# Patient Record
Sex: FEMALE | Race: WHITE | NOT HISPANIC OR LATINO | Employment: FULL TIME | URBAN - METROPOLITAN AREA
[De-identification: names, ages, dates, MRNs, and addresses within clinical notes are randomized per-mention and may not be internally consistent; named-entity substitution may affect disease eponyms.]

---

## 2020-10-26 ENCOUNTER — OFFICE VISIT (OUTPATIENT)
Dept: URGENT CARE | Facility: CLINIC | Age: 35
End: 2020-10-26
Payer: COMMERCIAL

## 2020-10-26 ENCOUNTER — HOSPITAL ENCOUNTER (OUTPATIENT)
Facility: MEDICAL CENTER | Age: 35
End: 2020-10-26
Attending: PHYSICIAN ASSISTANT
Payer: COMMERCIAL

## 2020-10-26 VITALS
HEART RATE: 88 BPM | TEMPERATURE: 97.5 F | RESPIRATION RATE: 16 BRPM | BODY MASS INDEX: 21.53 KG/M2 | HEIGHT: 66 IN | SYSTOLIC BLOOD PRESSURE: 128 MMHG | DIASTOLIC BLOOD PRESSURE: 80 MMHG | WEIGHT: 134 LBS | OXYGEN SATURATION: 99 %

## 2020-10-26 DIAGNOSIS — R43.0 LOSS OF PERCEPTION FOR SMELL: ICD-10-CM

## 2020-10-26 DIAGNOSIS — R09.81 SINUS CONGESTION: ICD-10-CM

## 2020-10-26 LAB — COVID ORDER STATUS COVID19: NORMAL

## 2020-10-26 PROCEDURE — U0003 INFECTIOUS AGENT DETECTION BY NUCLEIC ACID (DNA OR RNA); SEVERE ACUTE RESPIRATORY SYNDROME CORONAVIRUS 2 (SARS-COV-2) (CORONAVIRUS DISEASE [COVID-19]), AMPLIFIED PROBE TECHNIQUE, MAKING USE OF HIGH THROUGHPUT TECHNOLOGIES AS DESCRIBED BY CMS-2020-01-R: HCPCS

## 2020-10-26 PROCEDURE — 99203 OFFICE O/P NEW LOW 30 MIN: CPT | Performed by: PHYSICIAN ASSISTANT

## 2020-10-26 ASSESSMENT — ENCOUNTER SYMPTOMS
CHILLS: 0
SORE THROAT: 0
WHEEZING: 0
VOMITING: 0
FEVER: 0
NAUSEA: 0
DIARRHEA: 0
SHORTNESS OF BREATH: 0
SPUTUM PRODUCTION: 0
ABDOMINAL PAIN: 0
COUGH: 0

## 2020-10-26 NOTE — PROGRESS NOTES
"  Subjective:     Elidia Millard  is a 35 y.o. female who presents for Congestion (x4days, loss of smell)      Other  This is a new problem. The current episode started in the past 7 days. Pertinent negatives include no abdominal pain, chills, congestion, coughing, fever, nausea, rash, sore throat or vomiting.     Patient comes clinic complaining of loss of smell and some mild sinus congestion times last 3 or 4 days.  She denies fevers chills or cough.  She denies sore throat or ear pain.  She denies headache or diarrhea.  She denies nausea vomiting abdominal pain or rash.  She states she is been working from home has been very careful about exposure to individuals who may have Covid but was around a friend who is \"sniffly\" 1 week prior to onset of symptoms.  She denies history of asthma bronchitis or pneumonia.  She denies treatments tried thus far.  She states she has had absolutely no ability to smell yesterday and today.    Review of Systems   Constitutional: Negative for chills and fever.   HENT: Negative for congestion, ear pain and sore throat.         Loss of smell   Respiratory: Negative for cough, sputum production, shortness of breath and wheezing.    Gastrointestinal: Negative for abdominal pain, diarrhea, nausea and vomiting.   Skin: Negative for rash.       Medications:    • CitraNatal Assure Misc  • Norethindrone Acet-Ethinyl Est Tabs    Allergies: Pcn [penicillins]    Problem List: Elidia Millard does not have a problem list on file.    Surgical History:  No past surgical history on file.    Past Social Hx: Elidia Millard  reports that she has never smoked. She has never used smokeless tobacco. She reports that she does not drink alcohol or use drugs.     Past Family Hx:  Elidia Millard family history includes Cancer in her maternal grandmother and paternal grandmother.     Problem list, medications, and allergies reviewed by myself today in Epic.     Objective:   /80 (BP Location: " "Left arm, Patient Position: Sitting, BP Cuff Size: Adult)   Pulse 88   Temp 36.4 °C (97.5 °F) (Temporal)   Resp 16   Ht 1.676 m (5' 6\")   Wt 60.8 kg (134 lb)   SpO2 99%   BMI 21.63 kg/m²     Physical Exam  Vitals signs and nursing note reviewed.   Constitutional:       General: She is not in acute distress.     Appearance: She is well-developed. She is not diaphoretic.   HENT:      Head: Normocephalic and atraumatic.      Right Ear: Tympanic membrane, ear canal and external ear normal.      Left Ear: Tympanic membrane, ear canal and external ear normal.      Nose: Nose normal.      Comments: Unable to smell alcohol swab     Mouth/Throat:      Pharynx: Uvula midline. Posterior oropharyngeal erythema ( mild PND) present. No oropharyngeal exudate.      Tonsils: No tonsillar abscesses.   Eyes:      General: Lids are normal. No scleral icterus.        Right eye: No discharge.         Left eye: No discharge.      Conjunctiva/sclera: Conjunctivae normal.   Neck:      Musculoskeletal: Neck supple.   Pulmonary:      Effort: Pulmonary effort is normal. No respiratory distress.      Breath sounds: No decreased breath sounds, wheezing, rhonchi or rales.   Musculoskeletal: Normal range of motion.   Lymphadenopathy:      Cervical: No cervical adenopathy.   Skin:     General: Skin is warm and dry.      Coloration: Skin is not pale.      Findings: No erythema.   Neurological:      Mental Status: She is alert and oriented to person, place, and time. She is not disoriented.   Psychiatric:         Speech: Speech normal.         Behavior: Behavior normal.     COVID pending    Assessment/Plan:   Assessment      1. Loss of perception for smell  - COVID/SARS COV-2 PCR; Future    2. Sinus congestion  - COVID/SARS COV-2 PCR; Future  Supportive care is reviewed with patient/caregiver - recommend to push PO fluids and electrolytes, over-the-counter symptom support medications reviewed, ER precautions with worsened symptoms, quarantine " recommendations reviewed, sent with letter, [place] for results of testing  Return to clinic with lack of resolution or progression of symptoms.  ER precautions with any worsening symptoms are reviewed with patient/caregiver and they do express understanding      I have worn an N95 mask, gloves and eye protection for the entire encounter with this patient.     Differential diagnosis, natural history, supportive care, and indications for immediate follow-up discussed.

## 2020-10-26 NOTE — LETTER
October 26, 2020       Patient: Elidia Millard   YOB: 1985   Date of Visit: 10/26/2020         To Whom It May Concern:  A concern for COVID-19 has been identified and testing is in progress.?   ?  We are asking you to excuse absences while following self-isolation protocol per Center for Disease Control (CDC) guidelines. 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   ?  If the results of testing are negative, and once there has been no fever (temperature >100.4 F) for at least 72 hours without treatment, and no vomiting or diarrhea for at least 48 hours, then return to work is approved.   ?  If the results of testing are positive then your employee/student will be contacted by the [facility] or [facility] department for further instructions on duration of self-isolation and return to work protocol. In general, this will also follow the CDC guidelines with a minimum of 10 days from the onset of symptoms and without fever, vomiting, or diarrhea as above.?   ?  In general, repeat testing is not necessary and not offered through our [facility] care.?   ?  This is the only note that will be provided from [facility] for this visit. Your employee/student will require an appointment with a primary care provider if FMLA or disability forms are required.   ?      Sincerely,          Mikel Arredondo P.A.-C.  Electronically Signed

## 2020-10-27 ENCOUNTER — TELEPHONE (OUTPATIENT)
Dept: URGENT CARE | Facility: CLINIC | Age: 35
End: 2020-10-27

## 2020-10-27 LAB
SARS-COV-2 RNA RESP QL NAA+PROBE: DETECTED
SPECIMEN SOURCE: ABNORMAL

## 2020-10-27 NOTE — TELEPHONE ENCOUNTER
The lab called and notified us that the covid results came back positive.    Please advise.   Ludmila

## 2022-07-05 ENCOUNTER — ANESTHESIA EVENT (OUTPATIENT)
Dept: OBGYN | Facility: MEDICAL CENTER | Age: 37
End: 2022-07-05
Payer: COMMERCIAL

## 2022-07-05 ENCOUNTER — ANESTHESIA (OUTPATIENT)
Dept: OBGYN | Facility: MEDICAL CENTER | Age: 37
End: 2022-07-05
Payer: COMMERCIAL

## 2022-07-05 ENCOUNTER — HOSPITAL ENCOUNTER (INPATIENT)
Facility: MEDICAL CENTER | Age: 37
LOS: 4 days | End: 2022-07-09
Attending: OBSTETRICS & GYNECOLOGY | Admitting: OBSTETRICS & GYNECOLOGY
Payer: COMMERCIAL

## 2022-07-05 DIAGNOSIS — O92.79 POOR LATCH ON, POSTPARTUM: Primary | ICD-10-CM

## 2022-07-05 DIAGNOSIS — G89.18 PAIN FOLLOWING SURGERY OR PROCEDURE: ICD-10-CM

## 2022-07-05 LAB
ABO + RH BLD: NORMAL
ABO GROUP BLD: ABNORMAL
ANISOCYTOSIS BLD QL SMEAR: ABNORMAL
BARCODED ABORH UBTYP: 9500
BARCODED ABORH UBTYP: 9500
BARCODED PRD CODE UBPRD: ABNORMAL
BARCODED PRD CODE UBPRD: ABNORMAL
BARCODED UNIT NUM UBUNT: ABNORMAL
BARCODED UNIT NUM UBUNT: ABNORMAL
BASOPHILS # BLD AUTO: 0.9 % (ref 0–1.8)
BASOPHILS # BLD: 0.1 K/UL (ref 0–0.12)
BLD GP AB INVEST PLASRBC-IMP: ABNORMAL
BLD GP AB SCN SERPL QL: ABNORMAL
COMPONENT R 8504R: ABNORMAL
COMPONENT R 8504R: ABNORMAL
EOSINOPHIL # BLD AUTO: 0.18 K/UL (ref 0–0.51)
EOSINOPHIL NFR BLD: 1.7 % (ref 0–6.9)
ERYTHROCYTE [DISTWIDTH] IN BLOOD BY AUTOMATED COUNT: 50.9 FL (ref 35.9–50)
HCT VFR BLD AUTO: 29.1 % (ref 37–47)
HGB BLD-MCNC: 8.5 G/DL (ref 12–16)
LYMPHOCYTES # BLD AUTO: 2.02 K/UL (ref 1–4.8)
LYMPHOCYTES NFR BLD: 19.1 % (ref 22–41)
MANUAL DIFF BLD: NORMAL
MCH RBC QN AUTO: 21.1 PG (ref 27–33)
MCHC RBC AUTO-ENTMCNC: 29.2 G/DL (ref 33.6–35)
MCV RBC AUTO: 72.2 FL (ref 81.4–97.8)
MICROCYTES BLD QL SMEAR: ABNORMAL
MONOCYTES # BLD AUTO: 0.65 K/UL (ref 0–0.85)
MONOCYTES NFR BLD AUTO: 6.1 % (ref 0–13.4)
MORPHOLOGY BLD-IMP: NORMAL
MYELOCYTES NFR BLD MANUAL: 0.9 %
NEUTROPHILS # BLD AUTO: 7.56 K/UL (ref 2–7.15)
NEUTROPHILS NFR BLD: 70.4 % (ref 44–72)
NEUTS BAND NFR BLD MANUAL: 0.9 % (ref 0–10)
NRBC # BLD AUTO: 0.04 K/UL
NRBC BLD-RTO: 0.4 /100 WBC
OVALOCYTES BLD QL SMEAR: NORMAL
PLATELET # BLD AUTO: 280 K/UL (ref 164–446)
PLATELET BLD QL SMEAR: NORMAL
PMV BLD AUTO: 10.9 FL (ref 9–12.9)
POIKILOCYTOSIS BLD QL SMEAR: NORMAL
POLYCHROMASIA BLD QL SMEAR: NORMAL
PRODUCT TYPE UPROD: ABNORMAL
PRODUCT TYPE UPROD: ABNORMAL
RBC # BLD AUTO: 4.03 M/UL (ref 4.2–5.4)
RBC BLD AUTO: PRESENT
RH BLD: ABNORMAL
UNIT STATUS USTAT: ABNORMAL
UNIT STATUS USTAT: ABNORMAL
WBC # BLD AUTO: 10.6 K/UL (ref 4.8–10.8)
XXX BLOOD GROUP AB TITR SERPL AHG: 2 {TITER}

## 2022-07-05 PROCEDURE — 700102 HCHG RX REV CODE 250 W/ 637 OVERRIDE(OP)

## 2022-07-05 PROCEDURE — 59514 CESAREAN DELIVERY ONLY: CPT | Mod: 80 | Performed by: OBSTETRICS & GYNECOLOGY

## 2022-07-05 PROCEDURE — 770002 HCHG ROOM/CARE - OB PRIVATE (112)

## 2022-07-05 PROCEDURE — 160009 HCHG ANES TIME/MIN: Performed by: OBSTETRICS & GYNECOLOGY

## 2022-07-05 PROCEDURE — 700101 HCHG RX REV CODE 250: Performed by: ANESTHESIOLOGY

## 2022-07-05 PROCEDURE — 36415 COLL VENOUS BLD VENIPUNCTURE: CPT

## 2022-07-05 PROCEDURE — 88307 TISSUE EXAM BY PATHOLOGIST: CPT | Mod: 59

## 2022-07-05 PROCEDURE — 700111 HCHG RX REV CODE 636 W/ 250 OVERRIDE (IP): Performed by: ANESTHESIOLOGY

## 2022-07-05 PROCEDURE — A9270 NON-COVERED ITEM OR SERVICE: HCPCS | Performed by: ANESTHESIOLOGY

## 2022-07-05 PROCEDURE — 700111 HCHG RX REV CODE 636 W/ 250 OVERRIDE (IP): Performed by: OBSTETRICS & GYNECOLOGY

## 2022-07-05 PROCEDURE — 86922 COMPATIBILITY TEST ANTIGLOB: CPT | Mod: 91

## 2022-07-05 PROCEDURE — 160035 HCHG PACU - 1ST 60 MINS PHASE I: Performed by: OBSTETRICS & GYNECOLOGY

## 2022-07-05 PROCEDURE — 86900 BLOOD TYPING SEROLOGIC ABO: CPT

## 2022-07-05 PROCEDURE — 160041 HCHG SURGERY MINUTES - EA ADDL 1 MIN LEVEL 4: Performed by: OBSTETRICS & GYNECOLOGY

## 2022-07-05 PROCEDURE — 160029 HCHG SURGERY MINUTES - 1ST 30 MINS LEVEL 4: Performed by: OBSTETRICS & GYNECOLOGY

## 2022-07-05 PROCEDURE — 01961 ANES CESAREAN DELIVERY ONLY: CPT | Performed by: ANESTHESIOLOGY

## 2022-07-05 PROCEDURE — 58611 LIGATE OVIDUCT(S) ADD-ON: CPT | Mod: 80 | Performed by: OBSTETRICS & GYNECOLOGY

## 2022-07-05 PROCEDURE — C1755 CATHETER, INTRASPINAL: HCPCS | Performed by: OBSTETRICS & GYNECOLOGY

## 2022-07-05 PROCEDURE — 700105 HCHG RX REV CODE 258: Performed by: ANESTHESIOLOGY

## 2022-07-05 PROCEDURE — 160002 HCHG RECOVERY MINUTES (STAT): Performed by: OBSTETRICS & GYNECOLOGY

## 2022-07-05 PROCEDURE — 88302 TISSUE EXAM BY PATHOLOGIST: CPT | Mod: 59

## 2022-07-05 PROCEDURE — 160048 HCHG OR STATISTICAL LEVEL 1-5: Performed by: OBSTETRICS & GYNECOLOGY

## 2022-07-05 PROCEDURE — 86870 RBC ANTIBODY IDENTIFICATION: CPT

## 2022-07-05 PROCEDURE — 86901 BLOOD TYPING SEROLOGIC RH(D): CPT

## 2022-07-05 PROCEDURE — 0UB70ZZ EXCISION OF BILATERAL FALLOPIAN TUBES, OPEN APPROACH: ICD-10-PCS | Performed by: OBSTETRICS & GYNECOLOGY

## 2022-07-05 PROCEDURE — 700105 HCHG RX REV CODE 258: Performed by: OBSTETRICS & GYNECOLOGY

## 2022-07-05 PROCEDURE — 99283 EMERGENCY DEPT VISIT LOW MDM: CPT

## 2022-07-05 PROCEDURE — A9270 NON-COVERED ITEM OR SERVICE: HCPCS

## 2022-07-05 PROCEDURE — 86850 RBC ANTIBODY SCREEN: CPT

## 2022-07-05 PROCEDURE — 302790 HCHG STAT ANTEPARTUM CARE, DAILY

## 2022-07-05 PROCEDURE — 86886 COOMBS TEST INDIRECT TITER: CPT

## 2022-07-05 PROCEDURE — 85007 BL SMEAR W/DIFF WBC COUNT: CPT

## 2022-07-05 PROCEDURE — 700102 HCHG RX REV CODE 250 W/ 637 OVERRIDE(OP): Performed by: ANESTHESIOLOGY

## 2022-07-05 PROCEDURE — 85025 COMPLETE CBC W/AUTO DIFF WBC: CPT

## 2022-07-05 RX ORDER — CARBOPROST TROMETHAMINE 250 UG/ML
250 INJECTION, SOLUTION INTRAMUSCULAR
Status: CANCELLED | OUTPATIENT
Start: 2022-07-05

## 2022-07-05 RX ORDER — SODIUM CHLORIDE, SODIUM LACTATE, POTASSIUM CHLORIDE, CALCIUM CHLORIDE 600; 310; 30; 20 MG/100ML; MG/100ML; MG/100ML; MG/100ML
INJECTION, SOLUTION INTRAVENOUS ONCE
Status: CANCELLED | OUTPATIENT
Start: 2022-07-05 | End: 2022-07-05

## 2022-07-05 RX ORDER — DEXAMETHASONE SODIUM PHOSPHATE 4 MG/ML
INJECTION, SOLUTION INTRA-ARTICULAR; INTRALESIONAL; INTRAMUSCULAR; INTRAVENOUS; SOFT TISSUE PRN
Status: DISCONTINUED | OUTPATIENT
Start: 2022-07-05 | End: 2022-07-05 | Stop reason: SURG

## 2022-07-05 RX ORDER — HALOPERIDOL 5 MG/ML
1 INJECTION INTRAMUSCULAR
Status: CANCELLED | OUTPATIENT
Start: 2022-07-05

## 2022-07-05 RX ORDER — OXYCODONE HCL 5 MG/5 ML
5 SOLUTION, ORAL ORAL
Status: CANCELLED | OUTPATIENT
Start: 2022-07-05

## 2022-07-05 RX ORDER — METOCLOPRAMIDE HYDROCHLORIDE 5 MG/ML
10 INJECTION INTRAMUSCULAR; INTRAVENOUS ONCE
Status: COMPLETED | OUTPATIENT
Start: 2022-07-05 | End: 2022-07-05

## 2022-07-05 RX ORDER — ONDANSETRON 2 MG/ML
INJECTION INTRAMUSCULAR; INTRAVENOUS PRN
Status: DISCONTINUED | OUTPATIENT
Start: 2022-07-05 | End: 2022-07-05 | Stop reason: SURG

## 2022-07-05 RX ORDER — OXYTOCIN 10 [USP'U]/ML
10 INJECTION, SOLUTION INTRAMUSCULAR; INTRAVENOUS
Status: CANCELLED | OUTPATIENT
Start: 2022-07-05

## 2022-07-05 RX ORDER — DIPHENHYDRAMINE HYDROCHLORIDE 50 MG/ML
12.5 INJECTION INTRAMUSCULAR; INTRAVENOUS
Status: CANCELLED | OUTPATIENT
Start: 2022-07-05

## 2022-07-05 RX ORDER — CEFAZOLIN SODIUM 2 G/100ML
2 INJECTION, SOLUTION INTRAVENOUS ONCE
Status: COMPLETED | OUTPATIENT
Start: 2022-07-05 | End: 2022-07-05

## 2022-07-05 RX ORDER — HYDROMORPHONE HYDROCHLORIDE 1 MG/ML
0.4 INJECTION, SOLUTION INTRAMUSCULAR; INTRAVENOUS; SUBCUTANEOUS
Status: CANCELLED | OUTPATIENT
Start: 2022-07-05

## 2022-07-05 RX ORDER — METHYLERGONOVINE MALEATE 0.2 MG/ML
0.2 INJECTION INTRAVENOUS
Status: CANCELLED | OUTPATIENT
Start: 2022-07-05

## 2022-07-05 RX ORDER — CITRIC ACID/SODIUM CITRATE 334-500MG
30 SOLUTION, ORAL ORAL ONCE
Status: COMPLETED | OUTPATIENT
Start: 2022-07-05 | End: 2022-07-05

## 2022-07-05 RX ORDER — MISOPROSTOL 200 UG/1
800 TABLET ORAL
Status: CANCELLED | OUTPATIENT
Start: 2022-07-05

## 2022-07-05 RX ORDER — SODIUM CHLORIDE, SODIUM LACTATE, POTASSIUM CHLORIDE, CALCIUM CHLORIDE 600; 310; 30; 20 MG/100ML; MG/100ML; MG/100ML; MG/100ML
INJECTION, SOLUTION INTRAVENOUS CONTINUOUS
Status: CANCELLED | OUTPATIENT
Start: 2022-07-05

## 2022-07-05 RX ORDER — LABETALOL HYDROCHLORIDE 5 MG/ML
5 INJECTION, SOLUTION INTRAVENOUS
Status: CANCELLED | OUTPATIENT
Start: 2022-07-05

## 2022-07-05 RX ORDER — KETOROLAC TROMETHAMINE 30 MG/ML
INJECTION, SOLUTION INTRAMUSCULAR; INTRAVENOUS PRN
Status: DISCONTINUED | OUTPATIENT
Start: 2022-07-05 | End: 2022-07-05 | Stop reason: SURG

## 2022-07-05 RX ORDER — TRANEXAMIC ACID 100 MG/ML
INJECTION, SOLUTION INTRAVENOUS PRN
Status: DISCONTINUED | OUTPATIENT
Start: 2022-07-05 | End: 2022-07-05 | Stop reason: SURG

## 2022-07-05 RX ORDER — SODIUM CHLORIDE, SODIUM GLUCONATE, SODIUM ACETATE, POTASSIUM CHLORIDE AND MAGNESIUM CHLORIDE 526; 502; 368; 37; 30 MG/100ML; MG/100ML; MG/100ML; MG/100ML; MG/100ML
1500 INJECTION, SOLUTION INTRAVENOUS ONCE
Status: COMPLETED | OUTPATIENT
Start: 2022-07-05 | End: 2022-07-05

## 2022-07-05 RX ORDER — ASPIRIN 81 MG/1
81 TABLET, CHEWABLE ORAL DAILY
Status: ON HOLD | COMMUNITY
End: 2022-07-09

## 2022-07-05 RX ORDER — BUPIVACAINE HYDROCHLORIDE 7.5 MG/ML
INJECTION, SOLUTION INTRASPINAL
Status: COMPLETED | OUTPATIENT
Start: 2022-07-05 | End: 2022-07-05

## 2022-07-05 RX ORDER — CEFAZOLIN SODIUM 1 G/3ML
INJECTION, POWDER, FOR SOLUTION INTRAMUSCULAR; INTRAVENOUS PRN
Status: DISCONTINUED | OUTPATIENT
Start: 2022-07-05 | End: 2022-07-05 | Stop reason: SURG

## 2022-07-05 RX ORDER — HYDROMORPHONE HYDROCHLORIDE 1 MG/ML
0.1 INJECTION, SOLUTION INTRAMUSCULAR; INTRAVENOUS; SUBCUTANEOUS
Status: CANCELLED | OUTPATIENT
Start: 2022-07-05

## 2022-07-05 RX ORDER — SODIUM CHLORIDE, SODIUM LACTATE, POTASSIUM CHLORIDE, CALCIUM CHLORIDE 600; 310; 30; 20 MG/100ML; MG/100ML; MG/100ML; MG/100ML
INJECTION, SOLUTION INTRAVENOUS CONTINUOUS
Status: DISCONTINUED | OUTPATIENT
Start: 2022-07-05 | End: 2022-07-09 | Stop reason: HOSPADM

## 2022-07-05 RX ORDER — MORPHINE SULFATE 0.5 MG/ML
INJECTION, SOLUTION EPIDURAL; INTRATHECAL; INTRAVENOUS
Status: COMPLETED | OUTPATIENT
Start: 2022-07-05 | End: 2022-07-05

## 2022-07-05 RX ORDER — OXYTOCIN 10 [USP'U]/ML
INJECTION, SOLUTION INTRAMUSCULAR; INTRAVENOUS PRN
Status: DISCONTINUED | OUTPATIENT
Start: 2022-07-05 | End: 2022-07-05 | Stop reason: SURG

## 2022-07-05 RX ORDER — MIDAZOLAM HYDROCHLORIDE 1 MG/ML
1 INJECTION INTRAMUSCULAR; INTRAVENOUS
Status: CANCELLED | OUTPATIENT
Start: 2022-07-05

## 2022-07-05 RX ORDER — METOPROLOL TARTRATE 1 MG/ML
1 INJECTION, SOLUTION INTRAVENOUS
Status: CANCELLED | OUTPATIENT
Start: 2022-07-05

## 2022-07-05 RX ORDER — ONDANSETRON 2 MG/ML
4 INJECTION INTRAMUSCULAR; INTRAVENOUS
Status: CANCELLED | OUTPATIENT
Start: 2022-07-05

## 2022-07-05 RX ORDER — AZITHROMYCIN 500 MG/5ML
500 INJECTION, POWDER, LYOPHILIZED, FOR SOLUTION INTRAVENOUS ONCE
Status: DISCONTINUED | OUTPATIENT
Start: 2022-07-05 | End: 2022-07-05

## 2022-07-05 RX ORDER — MISOPROSTOL 200 UG/1
TABLET ORAL
Status: COMPLETED
Start: 2022-07-05 | End: 2022-07-05

## 2022-07-05 RX ORDER — OXYCODONE HCL 5 MG/5 ML
10 SOLUTION, ORAL ORAL
Status: CANCELLED | OUTPATIENT
Start: 2022-07-05

## 2022-07-05 RX ORDER — HYDROMORPHONE HYDROCHLORIDE 1 MG/ML
0.2 INJECTION, SOLUTION INTRAMUSCULAR; INTRAVENOUS; SUBCUTANEOUS
Status: CANCELLED | OUTPATIENT
Start: 2022-07-05

## 2022-07-05 RX ORDER — IPRATROPIUM BROMIDE AND ALBUTEROL SULFATE 2.5; .5 MG/3ML; MG/3ML
3 SOLUTION RESPIRATORY (INHALATION)
Status: CANCELLED | OUTPATIENT
Start: 2022-07-05

## 2022-07-05 RX ORDER — HYDRALAZINE HYDROCHLORIDE 20 MG/ML
5 INJECTION INTRAMUSCULAR; INTRAVENOUS
Status: CANCELLED | OUTPATIENT
Start: 2022-07-05

## 2022-07-05 RX ORDER — SODIUM CHLORIDE, SODIUM GLUCONATE, SODIUM ACETATE, POTASSIUM CHLORIDE AND MAGNESIUM CHLORIDE 526; 502; 368; 37; 30 MG/100ML; MG/100ML; MG/100ML; MG/100ML; MG/100ML
INJECTION, SOLUTION INTRAVENOUS
Status: DISCONTINUED | OUTPATIENT
Start: 2022-07-05 | End: 2022-07-05 | Stop reason: SURG

## 2022-07-05 RX ORDER — MEPERIDINE HYDROCHLORIDE 25 MG/ML
12.5 INJECTION INTRAMUSCULAR; INTRAVENOUS; SUBCUTANEOUS
Status: CANCELLED | OUTPATIENT
Start: 2022-07-05

## 2022-07-05 RX ADMIN — MISOPROSTOL 200 MCG: 200 TABLET ORAL at 23:30

## 2022-07-05 RX ADMIN — SODIUM CHLORIDE, SODIUM GLUCONATE, SODIUM ACETATE, POTASSIUM CHLORIDE AND MAGNESIUM CHLORIDE 1500 ML: 526; 502; 368; 37; 30 INJECTION, SOLUTION INTRAVENOUS at 21:22

## 2022-07-05 RX ADMIN — DEXAMETHASONE SODIUM PHOSPHATE 8 MG: 4 INJECTION, SOLUTION INTRA-ARTICULAR; INTRALESIONAL; INTRAMUSCULAR; INTRAVENOUS; SOFT TISSUE at 22:26

## 2022-07-05 RX ADMIN — ONDANSETRON 4 MG: 2 INJECTION INTRAMUSCULAR; INTRAVENOUS at 23:12

## 2022-07-05 RX ADMIN — SODIUM CHLORIDE, POTASSIUM CHLORIDE, SODIUM LACTATE AND CALCIUM CHLORIDE: 600; 310; 30; 20 INJECTION, SOLUTION INTRAVENOUS at 21:00

## 2022-07-05 RX ADMIN — CEFAZOLIN SODIUM 2 G: 2 INJECTION, SOLUTION INTRAVENOUS at 21:30

## 2022-07-05 RX ADMIN — FAMOTIDINE 20 MG: 10 INJECTION, SOLUTION INTRAVENOUS at 22:00

## 2022-07-05 RX ADMIN — SODIUM CHLORIDE, SODIUM GLUCONATE, SODIUM ACETATE, POTASSIUM CHLORIDE AND MAGNESIUM CHLORIDE: 526; 502; 368; 37; 30 INJECTION, SOLUTION INTRAVENOUS at 22:20

## 2022-07-05 RX ADMIN — OXYTOCIN 20 UNITS: 10 INJECTION, SOLUTION INTRAMUSCULAR; INTRAVENOUS at 23:04

## 2022-07-05 RX ADMIN — BUPIVACAINE HYDROCHLORIDE IN DEXTROSE 2 ML: 7.5 INJECTION, SOLUTION SUBARACHNOID at 22:40

## 2022-07-05 RX ADMIN — SODIUM CHLORIDE, SODIUM GLUCONATE, SODIUM ACETATE, POTASSIUM CHLORIDE AND MAGNESIUM CHLORIDE: 526; 502; 368; 37; 30 INJECTION, SOLUTION INTRAVENOUS at 22:56

## 2022-07-05 RX ADMIN — METOCLOPRAMIDE 10 MG: 5 INJECTION, SOLUTION INTRAMUSCULAR; INTRAVENOUS at 22:00

## 2022-07-05 RX ADMIN — KETOROLAC TROMETHAMINE 30 MG: 30 INJECTION, SOLUTION INTRAMUSCULAR at 23:19

## 2022-07-05 RX ADMIN — TRANEXAMIC ACID 1000 MG: 100 INJECTION, SOLUTION INTRAVENOUS at 22:26

## 2022-07-05 RX ADMIN — MORPHINE SULFATE 150 MCG: 0.5 INJECTION, SOLUTION EPIDURAL; INTRATHECAL; INTRAVENOUS at 22:40

## 2022-07-05 RX ADMIN — PHENYLEPHRINE HYDROCHLORIDE 20 MCG/MIN: 10 INJECTION INTRAVENOUS at 22:25

## 2022-07-05 RX ADMIN — CEFAZOLIN 2 G: 330 INJECTION, POWDER, FOR SOLUTION INTRAMUSCULAR; INTRAVENOUS at 22:26

## 2022-07-05 RX ADMIN — SODIUM CITRATE AND CITRIC ACID MONOHYDRATE 30 ML: 500; 334 SOLUTION ORAL at 22:01

## 2022-07-05 ASSESSMENT — LIFESTYLE VARIABLES
HAVE PEOPLE ANNOYED YOU BY CRITICIZING YOUR DRINKING: NO
HAVE YOU EVER FELT YOU SHOULD CUT DOWN ON YOUR DRINKING: NO
EVER HAD A DRINK FIRST THING IN THE MORNING TO STEADY YOUR NERVES TO GET RID OF A HANGOVER: NO
EVER FELT BAD OR GUILTY ABOUT YOUR DRINKING: NO
AVERAGE NUMBER OF DAYS PER WEEK YOU HAVE A DRINK CONTAINING ALCOHOL: 0
TOTAL SCORE: 0
EVER_SMOKED: NEVER
CONSUMPTION TOTAL: NEGATIVE
TOTAL SCORE: 0
ON A TYPICAL DAY WHEN YOU DRINK ALCOHOL HOW MANY DRINKS DO YOU HAVE: 0
HOW MANY TIMES IN THE PAST YEAR HAVE YOU HAD 5 OR MORE DRINKS IN A DAY: 0
TOTAL SCORE: 0
ALCOHOL_USE: NO

## 2022-07-05 ASSESSMENT — PATIENT HEALTH QUESTIONNAIRE - PHQ9
SUM OF ALL RESPONSES TO PHQ9 QUESTIONS 1 AND 2: 0
1. LITTLE INTEREST OR PLEASURE IN DOING THINGS: NOT AT ALL
2. FEELING DOWN, DEPRESSED, IRRITABLE, OR HOPELESS: NOT AT ALL
2. FEELING DOWN, DEPRESSED, IRRITABLE, OR HOPELESS: NOT AT ALL
SUM OF ALL RESPONSES TO PHQ9 QUESTIONS 1 AND 2: 0
1. LITTLE INTEREST OR PLEASURE IN DOING THINGS: NOT AT ALL

## 2022-07-05 ASSESSMENT — COPD QUESTIONNAIRES
IN THE PAST 12 MONTHS DO YOU DO LESS THAN YOU USED TO BECAUSE OF YOUR BREATHING PROBLEMS: DISAGREE/UNSURE
COPD SCREENING SCORE: 0
DURING THE PAST 4 WEEKS HOW MUCH DID YOU FEEL SHORT OF BREATH: NONE/LITTLE OF THE TIME
DO YOU EVER COUGH UP ANY MUCUS OR PHLEGM?: NO/ONLY WITH OCCASIONAL COLDS OR INFECTIONS
HAVE YOU SMOKED AT LEAST 100 CIGARETTES IN YOUR ENTIRE LIFE: NO/DON'T KNOW

## 2022-07-05 NOTE — H&P
Department of Obstetrics and Gynecology  Labor and Delivery History and Physical    Date of Admission: 2022     ID: 37 y.o.  @ 35w4d with di/di TIUP    Primary OB: Araceli Moeller M.D.     Attending OB: Daisy Ramires M.D.     CC: Referred from [facility] for fetal deceleration on twin B     HPI: Elidia Millard is a 37 y.o.  @ 35w4d here for prolonged monitoring for fetal deceleration on twin B while undergoing fetal surveillance at [facility]. She is feeling good FM. Denies CTX. She would prefer to have an  if possible, but baby B has been breech for awhile. If she has a csxn, she would like sterilization.     Pregnancy notable for di/di TIUP with IUGR and VSD on twin A (vertex) and normal growth on twin B. Pregnancy also complicated by GDMA1 and AMA      ROS: 10 systems reviewed and negative except as noted above.    Obstetric History    - 2008, female, , 7lb3oz, Epidural, [facility]  G2 - , female, , [id number], no pain medis  G3 - spontaneous di/di twins. Genders a surprise!       Past Medical History  Surgical History   Asthma  H/o Covid 10/2020  S/p J and J vaccine. No booster none      Gynecologic History  Social History   regular menses prior to pregnancy  denies Hx of abnormal pap smears.  denies Hx of STIs Tobacco: denies  EtOH: denies  Street Drugs: denies  .  is Michael.       Medications  Allergies   No current facility-administered medications on file prior to encounter.     Current Outpatient Medications on File Prior to Encounter   Medication Sig Dispense Refill   • Norethindrone Acet-Ethinyl Est (LOESTRIN 1.5, 21,) 1.5-30 MG-MCG Tab Take  by mouth.     • Prenat w/o A-FeCbGl-DSS-FA-DHA (CITRANATAL ASSURE) 35-1 & 300 MG MISC Take 1 Cap by mouth every day. (Patient not taking: Reported on 10/26/2020) 30 Each 12    Pcn [penicillins]-->rash        O: /83   Pulse (!) 109   Temp 36.8 °C (98.3 °F) (Temporal)   Resp 18     Gen: NAD, AAO  Abd: Gravid,  NTTP,Cephalic by Leopolds, No rebound or guarding  Ext: NTTP, no edema, 2+DPP  Pelvic: SVE deferred     FHT A: 120s with moderate long-term variability accelerations present no decelerations noted  FHT B: Baseline 150s with moderate long-term variability accelerations present occasional variable decelerations noted to 70 bpm  [place]: occasional    Labs:   Recent Labs     22  1754   WBC 10.6   RBC 4.03*   HEMOGLOBIN 8.5*   HEMATOCRIT 29.1*   MCV 72.2*   MCH 21.1*   RDW 50.9*   PLATELETCT 280   MPV 10.9   NEUTSPOLYS 70.40   LYMPHOCYTES 19.10*   MONOCYTES 6.10   EOSINOPHILS 1.70   BASOPHILS 0.90   RBCMORPHOLO Present     Prenatal labs:  Rh negative, ABS negative, RubImm, HBsAg, HIV NR, RPR NR, Simran immune .  1h glucose 190.  GBS unknown.          A/P: Elidia Wade Millard is a 37 y.o.  @ 35w4d with di/di TIUP, IUGR Fetus A, Fetal deceleration Fetus B, GDMA1, AMA    Discussion: Intermittent fetal decelerations have been noted on twin B which is breech.  Twin A is noted to be IUGR with a fetal VSD.  Given the current fetal surveillance and gestational age I would recommend proceeding with delivery.  She is amenable to a primary  section secondary to malpresentation.  She desires tubal sterilization.    The risks of a  have been reviewed with her in detail.  These include but are not limited to bleeding, pain, infection, damage to other organs nerves or blood vessels.  In the event of life-threatening blood loss hysterectomy or blood transfusion may be warranted.  She consents to this if clinically indicated.  She does not plan any future pregnancies and desires bilateral salpingectomy.  The risk of chronic pain or adhesions has been reviewed with her.  With regards to bilateral salpingectomy.  She understands that this is a permanent procedure.  She may have alterations in her menstrual course following this.  Including increased menorrhagia or dysmenorrhea.  All of her questions have been  answered and consent has been signed.  She also consents to blood transfusion if indicated    The risks of prematurity including the need for NICU admission.  Respiratory distress, glucose instability, temperature's instability, and increased jaundice have been reviewed with her.     Plan  -Admit to labor and delivery  -Type and screen  -Noted to be anemic. 2u PRBC on hold.  -NPO  -Plan LTCS/BS    Daisy Ramires M.D.,  7/5/2022, 4:17 PM

## 2022-07-05 NOTE — PROGRESS NOTES
1355: External monitors applied. Pt presents to L&D after being seen in the office today. Pt sent over for extended monitoring.     1730: Dr. Ramires notified of pt's FHT deceleration. Orders received.     1900: Report given to Amy ASH RN. POC discussed.

## 2022-07-06 LAB
ERYTHROCYTE [DISTWIDTH] IN BLOOD BY AUTOMATED COUNT: 51.8 FL (ref 35.9–50)
HCT VFR BLD AUTO: 22 % (ref 37–47)
HGB BLD-MCNC: 6.3 G/DL (ref 12–16)
MCH RBC QN AUTO: 20.9 PG (ref 27–33)
MCHC RBC AUTO-ENTMCNC: 28.6 G/DL (ref 33.6–35)
MCV RBC AUTO: 72.8 FL (ref 81.4–97.8)
NUMBER OF RH DOSES IND 8505RD: NORMAL
PATHOLOGY CONSULT NOTE: NORMAL
PLATELET # BLD AUTO: 232 K/UL (ref 164–446)
PMV BLD AUTO: 10.6 FL (ref 9–12.9)
RBC # BLD AUTO: 3.02 M/UL (ref 4.2–5.4)
WBC # BLD AUTO: 13.8 K/UL (ref 4.8–10.8)

## 2022-07-06 PROCEDURE — 700111 HCHG RX REV CODE 636 W/ 250 OVERRIDE (IP)

## 2022-07-06 PROCEDURE — 700111 HCHG RX REV CODE 636 W/ 250 OVERRIDE (IP): Performed by: OBSTETRICS & GYNECOLOGY

## 2022-07-06 PROCEDURE — 36415 COLL VENOUS BLD VENIPUNCTURE: CPT

## 2022-07-06 PROCEDURE — 770002 HCHG ROOM/CARE - OB PRIVATE (112)

## 2022-07-06 PROCEDURE — A9270 NON-COVERED ITEM OR SERVICE: HCPCS | Performed by: ANESTHESIOLOGY

## 2022-07-06 PROCEDURE — 700102 HCHG RX REV CODE 250 W/ 637 OVERRIDE(OP): Performed by: ANESTHESIOLOGY

## 2022-07-06 PROCEDURE — 85027 COMPLETE CBC AUTOMATED: CPT

## 2022-07-06 RX ORDER — DIPHENHYDRAMINE HCL 25 MG
25 TABLET ORAL EVERY 6 HOURS PRN
Status: DISCONTINUED | OUTPATIENT
Start: 2022-07-07 | End: 2022-07-09 | Stop reason: HOSPADM

## 2022-07-06 RX ORDER — OXYCODONE HYDROCHLORIDE 10 MG/1
10 TABLET ORAL EVERY 4 HOURS PRN
Status: ACTIVE | OUTPATIENT
Start: 2022-07-06 | End: 2022-07-07

## 2022-07-06 RX ORDER — DIPHENHYDRAMINE HYDROCHLORIDE 50 MG/ML
25 INJECTION INTRAMUSCULAR; INTRAVENOUS EVERY 6 HOURS PRN
Status: ACTIVE | OUTPATIENT
Start: 2022-07-06 | End: 2022-07-07

## 2022-07-06 RX ORDER — DIPHENHYDRAMINE HYDROCHLORIDE 50 MG/ML
12.5 INJECTION INTRAMUSCULAR; INTRAVENOUS EVERY 6 HOURS PRN
Status: ACTIVE | OUTPATIENT
Start: 2022-07-06 | End: 2022-07-07

## 2022-07-06 RX ORDER — ACETAMINOPHEN 500 MG
1000 TABLET ORAL EVERY 6 HOURS PRN
Status: DISCONTINUED | OUTPATIENT
Start: 2022-07-10 | End: 2022-07-09 | Stop reason: HOSPADM

## 2022-07-06 RX ORDER — ONDANSETRON 4 MG/1
4 TABLET, ORALLY DISINTEGRATING ORAL EVERY 6 HOURS PRN
Status: DISCONTINUED | OUTPATIENT
Start: 2022-07-07 | End: 2022-07-09 | Stop reason: HOSPADM

## 2022-07-06 RX ORDER — HYDROMORPHONE HYDROCHLORIDE 1 MG/ML
0.2 INJECTION, SOLUTION INTRAMUSCULAR; INTRAVENOUS; SUBCUTANEOUS
Status: ACTIVE | OUTPATIENT
Start: 2022-07-06 | End: 2022-07-07

## 2022-07-06 RX ORDER — KETOROLAC TROMETHAMINE 30 MG/ML
30 INJECTION, SOLUTION INTRAMUSCULAR; INTRAVENOUS EVERY 6 HOURS
Status: DISCONTINUED | OUTPATIENT
Start: 2022-07-06 | End: 2022-07-06

## 2022-07-06 RX ORDER — SODIUM CHLORIDE, SODIUM LACTATE, POTASSIUM CHLORIDE, CALCIUM CHLORIDE 600; 310; 30; 20 MG/100ML; MG/100ML; MG/100ML; MG/100ML
INJECTION, SOLUTION INTRAVENOUS PRN
Status: DISCONTINUED | OUTPATIENT
Start: 2022-07-06 | End: 2022-07-09 | Stop reason: HOSPADM

## 2022-07-06 RX ORDER — IBUPROFEN 800 MG/1
800 TABLET ORAL EVERY 8 HOURS
Status: DISCONTINUED | OUTPATIENT
Start: 2022-07-07 | End: 2022-07-09 | Stop reason: HOSPADM

## 2022-07-06 RX ORDER — ONDANSETRON 2 MG/ML
4 INJECTION INTRAMUSCULAR; INTRAVENOUS EVERY 6 HOURS PRN
Status: ACTIVE | OUTPATIENT
Start: 2022-07-06 | End: 2022-07-07

## 2022-07-06 RX ORDER — ACETAMINOPHEN 500 MG
1000 TABLET ORAL EVERY 6 HOURS
Status: COMPLETED | OUTPATIENT
Start: 2022-07-06 | End: 2022-07-06

## 2022-07-06 RX ORDER — DIPHENHYDRAMINE HYDROCHLORIDE 50 MG/ML
25 INJECTION INTRAMUSCULAR; INTRAVENOUS EVERY 6 HOURS PRN
Status: DISCONTINUED | OUTPATIENT
Start: 2022-07-07 | End: 2022-07-09 | Stop reason: HOSPADM

## 2022-07-06 RX ORDER — ACETAMINOPHEN 500 MG
1000 TABLET ORAL EVERY 6 HOURS
Status: DISCONTINUED | OUTPATIENT
Start: 2022-07-07 | End: 2022-07-09 | Stop reason: HOSPADM

## 2022-07-06 RX ORDER — ONDANSETRON 2 MG/ML
4 INJECTION INTRAMUSCULAR; INTRAVENOUS EVERY 6 HOURS PRN
Status: DISCONTINUED | OUTPATIENT
Start: 2022-07-07 | End: 2022-07-09 | Stop reason: HOSPADM

## 2022-07-06 RX ORDER — KETOROLAC TROMETHAMINE 30 MG/ML
30 INJECTION, SOLUTION INTRAMUSCULAR; INTRAVENOUS EVERY 6 HOURS
Status: DISPENSED | OUTPATIENT
Start: 2022-07-06 | End: 2022-07-07

## 2022-07-06 RX ORDER — OXYCODONE HYDROCHLORIDE 5 MG/1
5 TABLET ORAL EVERY 4 HOURS PRN
Status: DISCONTINUED | OUTPATIENT
Start: 2022-07-07 | End: 2022-07-09 | Stop reason: HOSPADM

## 2022-07-06 RX ORDER — OXYCODONE HYDROCHLORIDE 5 MG/1
5 TABLET ORAL EVERY 4 HOURS PRN
Status: ACTIVE | OUTPATIENT
Start: 2022-07-06 | End: 2022-07-07

## 2022-07-06 RX ORDER — DOCUSATE SODIUM 100 MG/1
100 CAPSULE, LIQUID FILLED ORAL 2 TIMES DAILY PRN
Status: DISCONTINUED | OUTPATIENT
Start: 2022-07-06 | End: 2022-07-09 | Stop reason: HOSPADM

## 2022-07-06 RX ORDER — KETOROLAC TROMETHAMINE 30 MG/ML
30 INJECTION, SOLUTION INTRAMUSCULAR; INTRAVENOUS EVERY 6 HOURS
Status: ACTIVE | OUTPATIENT
Start: 2022-07-06 | End: 2022-07-07

## 2022-07-06 RX ORDER — IBUPROFEN 800 MG/1
800 TABLET ORAL EVERY 8 HOURS PRN
Status: DISCONTINUED | OUTPATIENT
Start: 2022-07-10 | End: 2022-07-09 | Stop reason: HOSPADM

## 2022-07-06 RX ORDER — HYDROMORPHONE HYDROCHLORIDE 1 MG/ML
0.4 INJECTION, SOLUTION INTRAMUSCULAR; INTRAVENOUS; SUBCUTANEOUS
Status: ACTIVE | OUTPATIENT
Start: 2022-07-06 | End: 2022-07-07

## 2022-07-06 RX ORDER — VITAMIN A ACETATE, BETA CAROTENE, ASCORBIC ACID, CHOLECALCIFEROL, .ALPHA.-TOCOPHEROL ACETATE, DL-, THIAMINE MONONITRATE, RIBOFLAVIN, NIACINAMIDE, PYRIDOXINE HYDROCHLORIDE, FOLIC ACID, CYANOCOBALAMIN, CALCIUM CARBONATE, FERROUS FUMARATE, ZINC OXIDE, CUPRIC OXIDE 3080; 12; 120; 400; 1; 1.84; 3; 20; 22; 920; 25; 200; 27; 10; 2 [IU]/1; UG/1; MG/1; [IU]/1; MG/1; MG/1; MG/1; MG/1; MG/1; [IU]/1; MG/1; MG/1; MG/1; MG/1; MG/1
1 TABLET, FILM COATED ORAL
Status: DISCONTINUED | OUTPATIENT
Start: 2022-07-06 | End: 2022-07-09 | Stop reason: HOSPADM

## 2022-07-06 RX ORDER — OXYCODONE HYDROCHLORIDE 10 MG/1
10 TABLET ORAL EVERY 4 HOURS PRN
Status: DISCONTINUED | OUTPATIENT
Start: 2022-07-07 | End: 2022-07-09 | Stop reason: HOSPADM

## 2022-07-06 RX ADMIN — ACETAMINOPHEN 1000 MG: 500 TABLET ORAL at 03:14

## 2022-07-06 RX ADMIN — ACETAMINOPHEN 1000 MG: 500 TABLET ORAL at 09:03

## 2022-07-06 RX ADMIN — KETOROLAC TROMETHAMINE 30 MG: 30 INJECTION, SOLUTION INTRAMUSCULAR; INTRAVENOUS at 18:20

## 2022-07-06 RX ADMIN — OXYTOCIN 20 UNITS: 10 INJECTION, SOLUTION INTRAMUSCULAR; INTRAVENOUS at 00:25

## 2022-07-06 RX ADMIN — ACETAMINOPHEN 1000 MG: 500 TABLET ORAL at 16:53

## 2022-07-06 RX ADMIN — ACETAMINOPHEN 1000 MG: 500 TABLET ORAL at 21:26

## 2022-07-06 RX ADMIN — KETOROLAC TROMETHAMINE 30 MG: 30 INJECTION, SOLUTION INTRAMUSCULAR; INTRAVENOUS at 06:25

## 2022-07-06 ASSESSMENT — EDINBURGH POSTNATAL DEPRESSION SCALE (EPDS)
I HAVE BLAMED MYSELF UNNECESSARILY WHEN THINGS WENT WRONG: NOT VERY OFTEN
I HAVE BEEN SO UNHAPPY THAT I HAVE BEEN CRYING: NO, NEVER
I HAVE BEEN ANXIOUS OR WORRIED FOR NO GOOD REASON: YES, VERY OFTEN
I HAVE FELT SCARED OR PANICKY FOR NO GOOD REASON: NO, NOT MUCH
THE THOUGHT OF HARMING MYSELF HAS OCCURRED TO ME: NEVER
I HAVE BEEN ABLE TO LAUGH AND SEE THE FUNNY SIDE OF THINGS: AS MUCH AS I ALWAYS COULD
THINGS HAVE BEEN GETTING ON TOP OF ME: YES, SOMETIMES I HAVEN'T BEEN COPING AS WELL AS USUAL
I HAVE FELT SAD OR MISERABLE: NO, NOT AT ALL
I HAVE LOOKED FORWARD WITH ENJOYMENT TO THINGS: AS MUCH AS I EVER DID
I HAVE BEEN SO UNHAPPY THAT I HAVE HAD DIFFICULTY SLEEPING: NOT AT ALL

## 2022-07-06 NOTE — PROGRESS NOTES
1900- Report received from CARMEL Arellano. Care of patient assumed at this time.     1936-  Working at bedside to discuss POC.     2223- audible heart tones doppled in the OR    2301- delivery of viable baby girl    2303- delivery of viable baby boy    0115- Report given to CARMEL Soto. Care relinquished.

## 2022-07-06 NOTE — LACTATION NOTE
"This note was copied from a baby's chart.  Twins 35.5 weeks, , babies 10 hours old, baby A girl= 4# 7.6 oz, baby B boy= 4# 8 oz. CHERY Hx GDM- diet controlled. MOB reports she did breastfeed previous babies x 14-18 months. MOB has baby B STS allowing baby to suckle & lick nipple, no latch. Mother slow paced bottle feed 10 ml's of DBM. Patient's nurse slow pace bottle feeding baby A girl.     3 step plan:  1. Breastfeed  2. Supplement according to Guidelines 10-20-30  3. Pump & hand express   Every 3 hours or sooner if baby cues, feed a minimum 8 or more times in 24 hours    Pump settings speed 80/60, suction 28% x 15 minutes then hand express x 2-3 minutes, flange 25 mm. Pumped 14 ml of colostrum with 1st pump session, feed back at next feed. Teaching on cleaning pump parts after each pump session. Encouraged mother to keep babies STS while awake, will help promote breastfeeding. CHERY watched [employer] \"hand expression\" video,  provided demo on hand expression.      CHERY has [employer] insurance, OP lactation order placed in Epic.     Information sheets provided with review:  1. Storage Guidelines  2. Supplemental Guidelines 10-20-30  3. HG pump rental  4. NNB Resource  5. Your LPI    "

## 2022-07-06 NOTE — OR SURGEON
OP Note    PreOp Diagnosis:   1. 36yo  @ 35w5d  2. Di/di TIUP  3. IUGR fetus A  4. NRFS fetus B  5. AMA  6. Anemia  7. Desires sterilization  8. Vertex/breech presentation      PostOp Diagnosis:   1. S/p primary LTCS and bilateral salpingectomy  2. Di/Di TIUP  3. Vertex/Breech presentation  4. AMA  6. Anemia  7. Desires sterilization      Procedure(s):   SECTION, PRIMARY TWINS  BILATERAL SALPINGECTOMY    Surgeon(s):  JANI Melara M.D.     Anesthesiologist/Type of Anesthesia:  Anesthesiologist: Kenan Zuniga M.D./Spinal    Indication: Elidia is a 37-year-old  3 para 2-0-0-2 at 35 weeks and 5 days who has been undergoing fetal surveillance for a twin intrauterine pregnancy advanced maternal age and IUGR of twin A.  During her fetal surveillance today at the [facility] and here at the hospital fetus B has been having spontaneous decelerations to 70 bpm based on this finding as well as gestational age decision was made to proceed with delivery.  She has had a vertex breech presentation and desires primary  section with bilateral salpingectomy    Procedure: The patient was taken back to the operating room where she underwent placement of spinal anesthesia.  The initial spinal was not functioning adequately as a result she sat up again and a second spinal anesthesia was placed.  She was as positioned in dorsolithotomy position Barnhart catheter was placed under sterile conditions and she was sterilely prepped and draped in the usual fashion.  After assuring adequate anesthesia Pfannenstiel skin incision was made sharply and carried down to level fascia fascial incision was extended bilaterally with Garcia scissors Jil's were placed on the inferior aspect of the fascia which was dissected both sharply and bluntly the same procedure was then carried out superiorly midline of the rectus was identified and the peritoneum was entered bluntly peritoneal incision  "was extended superiorly and inferiorly with Metzenbaum scissors the Hao retractor was placed bladder flap was created using Metzenbaums.  The incision uterine incision was made sharply using a scalpel bag of water for fetus a was ruptured and noted to be clear fetus a was delivered vertex atraumatically and was immediately vigorous cortisol to pulsate at which point it was clamped and cut fetus bag of water for fetus B was then ruptured to show clear fluid and delivered in a breech presentation he was immediately vigorous cord was held to pulsate for 30 seconds at which point it was clamped and cut Pitocin was initiated both blood symptoms were delivered easily and intact under gentle manual traction with removal of trailing membranes the uterus was cleaned internally with a dry lap to assure no remaining products of conception hysterotomy site was closed with a running lock suture of 0 Vicryl a single figure-of-eight suture was placed to provide adequate hemostasis the patient verbally consents firmed that she desired sterilization tubes and ovaries were inspected and held with a Piter.  Hand-held LigaSure was used to serially cauterize cut and remove each tube in its entirety hysterotomy site was again inspected and was hemostatic the Hao retractor was removed peritoneal incision was reapproximated 3-0 Vicryl the fascia was reapproximated with 0 Vicryl subcutaneous tissue was irrigated and was reapproximated 3-0 Vicryl skin was closed with 4-0 Vicryl    Specimens removed if any:  1. Bilateral tubes  2. Cord gases    Estimated Blood Loss: 800cc  Fluids: 1500cc  UOP: 200cc    Findings:   1. Baby A @ 2301, Vertex, clear amniotic fluid, female, Apgars 8/9, Weight 2030g, \"Luciel\"   2. Baby B @ 2302, Breech, clear amniotic fluid, male, Apgars 8/9, Weight 2040g  \"Naun\"   Nuchal cord x1, Body cord x 1   3. Normal uterus, ovaries, and tubes  4. Di/Di placenta     Medications  1. Ancef 2g  2. TXA 1g  3. Pitocin 20 " units     Complications: none apparent        7/5/2022 11:34 PM Daisy Ramires M.D.

## 2022-07-06 NOTE — ANESTHESIA PROCEDURE NOTES
Spinal Block    Date/Time: 7/5/2022 10:40 PM  Performed by: Kenan Zuniga M.D.  Authorized by: Kenan Zuniga M.D.     Patient Location:  OB  Start Time:  7/5/2022 10:40 PM  End Time:  7/5/2022 10:45 PM  Reason for Block: primary anesthetic    patient identified, IV checked, site marked, risks and benefits discussed, surgical consent, monitors and equipment checked, pre-op evaluation and timeout performed    Patient Position:  Sitting  Prep: ChloraPrep, patient draped and sterile technique    Monitoring:  Blood pressure, continuous pulse oximetry and heart rate  Approach:  Midline  Location:  L3-4  Injection Technique:  Single-shot  Skin infiltration:  Lidocaine  Strength:  1%  Dose:  3ml  Needle Type:  Pencan  Needle Gauge:  25 G  CSF flowing pre/post injection:  Yes  Sensory Level:  T6  Events: failed spinal    Events comment:  Repeated procedure twice.  See note below   Initial procedure done the same as documented procedure above.  No block after about 7-8 min.  Repeated.

## 2022-07-06 NOTE — ANESTHESIA POSTPROCEDURE EVALUATION
Patient: Elidia Wade Millard    Procedure Summary     Date: 22 Room / Location: [facility] OR 02 / SURGERY LABOR AND DELIVERY    Anesthesia Start:  Anesthesia Stop:     Procedure:  SECTION, PRIMARY TWINS (Abdomen) Diagnosis: (DICHORIONIC DIAMNIOTIC TWIN PREGNANCY 37.3 WEEKS IUGR)    Surgeons: Daisy Ramires M.D. Responsible Provider: Kenan Zuniga M.D.    Anesthesia Type: spinal ASA Status: 2          Final Anesthesia Type: spinal  Last vitals  BP   Blood Pressure: 128/83    Temp   36.8 °C (98.3 °F)    Pulse   100   Resp   18    SpO2          Anesthesia Post Evaluation    Patient location during evaluation: PACU  Patient participation: complete - patient participated  Level of consciousness: awake and alert    Airway patency: patent  Anesthetic complications: no  Cardiovascular status: hemodynamically stable  Respiratory status: acceptable  Hydration status: euvolemic    PONV: none          No complications documented.     Nurse Pain Score: 0 (NPRS)

## 2022-07-06 NOTE — ANESTHESIA PREPROCEDURE EVALUATION
Case: [id number] Date/Time: 22 2100    Procedure:  SECTION, PRIMARY TWINS (Abdomen)    Anesthesia type: Spinal    Pre-op diagnosis: DICHORIONIC DIAMNIOTIC TWIN PREGNANCY 37.3 WEEKS IUGR    Location: [facility] OR 01 / SURGERY LABOR AND DELIVERY    Surgeons: Daisy Ramires M.D.          Relevant Problems   No relevant active problems       Physical Exam    Airway   Mallampati: II  TM distance: >3 FB  Neck ROM: full       Cardiovascular - normal exam  Rhythm: regular  Rate: normal  (-) murmur     Dental - normal exam           Pulmonary - normal exam  Breath sounds clear to auscultation     Abdominal    Neurological - normal exam                 Anesthesia Plan    ASA 2       Plan - spinal   Neuraxial block will be primary anesthetic                Postoperative Plan: Postoperative administration of opioids is intended.    Pertinent diagnostic labs and testing reviewed    Informed Consent:    Anesthetic plan and risks discussed with patient.

## 2022-07-06 NOTE — PROGRESS NOTES
Progress Note    Elidia Wade Millard   Post-op day 1  Chief Admitting Dx: Indication for care in labor or delivery [O75.9]  Delivery Type:  for twins, NRFHT of Twin B, Twin A with FGR      Subjective:  Ambulating: no  Tolerating oral feed: yes  Flatus: no    Voiding: no  Dizziness: no  Vitals:    22 0400 22 0500 22 0600 22 0800   BP:   120/70    Pulse: 86 84 86 88   Resp: 18 18 18 16   Temp:   36.7 °C (98 °F)    TempSrc:   Temporal    SpO2: 95% 96% 95% 95%   Weight:       Height:           Exam:  Abdomen: Abdomen soft, non-tender. BS normal. No masses,  No organomegaly  Incision: dry and intact  Fundus Tenderness: no  Below umbilicus: yes  Perineum: perineum intact  Extremities: Normal  Lochia: mild    Labs:   Recent Results (from the past 24 hour(s))   CBC WITH DIFFERENTIAL    Collection Time: 22  5:54 PM   Result Value Ref Range    WBC 10.6 4.8 - 10.8 K/uL    RBC 4.03 (L) 4.20 - 5.40 M/uL    Hemoglobin 8.5 (L) 12.0 - 16.0 g/dL    Hematocrit 29.1 (L) 37.0 - 47.0 %    MCV 72.2 (L) 81.4 - 97.8 fL    MCH 21.1 (L) 27.0 - 33.0 pg    MCHC 29.2 (L) 33.6 - 35.0 g/dL    RDW 50.9 (H) 35.9 - 50.0 fL    Platelet Count 280 164 - 446 K/uL    MPV 10.9 9.0 - 12.9 fL    Neutrophils-Polys 70.40 44.00 - 72.00 %    Lymphocytes 19.10 (L) 22.00 - 41.00 %    Monocytes 6.10 0.00 - 13.40 %    Eosinophils 1.70 0.00 - 6.90 %    Basophils 0.90 0.00 - 1.80 %    Nucleated RBC 0.40 /100 WBC    Neutrophils (Absolute) 7.56 (H) 2.00 - 7.15 K/uL    Lymphs (Absolute) 2.02 1.00 - 4.80 K/uL    Monos (Absolute) 0.65 0.00 - 0.85 K/uL    Eos (Absolute) 0.18 0.00 - 0.51 K/uL    Baso (Absolute) 0.10 0.00 - 0.12 K/uL    NRBC (Absolute) 0.04 K/uL    Anisocytosis 2+ (A)     Microcytosis 2+ (A)    COD - Adult (Type and Screen)    Collection Time: 22  5:54 PM   Result Value Ref Range    ABO Grouping Only O     Rh Grouping Only NEG     Antibody Screen-Cod POS (A)     Component R       R99                 Red Cells, LR        [id number]   selected     07/05/22   20:34      Product Type R99     Dispense Status selected     Unit Number (Barcoded) [id number]     Product Code (Barcoded) V0615N43     Blood Type (Barcoded) 9500     Component R       R99                 Red Cells, LR       [id number]   selected     07/05/22   20:34      Product Type R99     Dispense Status selected     Unit Number (Barcoded) [id number]     Product Code (Barcoded) R0248H50     Blood Type (Barcoded) 9500    ANTIBODY IDENTIFICATION    Collection Time: 07/05/22  5:54 PM   Result Value Ref Range    Antibody Id POS, anti-D due to RHIG injection (A)    DIFFERENTIAL MANUAL    Collection Time: 07/05/22  5:54 PM   Result Value Ref Range    Bands-Stabs 0.90 0.00 - 10.00 %    Myelocytes 0.90 %    Manual Diff Status PERFORMED    PERIPHERAL SMEAR REVIEW    Collection Time: 07/05/22  5:54 PM   Result Value Ref Range    Peripheral Smear Review see below    PLATELET ESTIMATE    Collection Time: 07/05/22  5:54 PM   Result Value Ref Range    Plt Estimation Normal    MORPHOLOGY    Collection Time: 07/05/22  5:54 PM   Result Value Ref Range    RBC Morphology Present     Polychromia 1+     Poikilocytosis 1+     Ovalocytes 1+    ANTIBODY TITRATION (TITER)    Collection Time: 07/05/22  5:54 PM   Result Value Ref Range    Antibody Titer 2    ABO Rh Confirm    Collection Time: 07/05/22  6:26 PM   Result Value Ref Range    ABO Rh Confirm O NEG    MOM RHHDN/RHOGAM    Collection Time: 07/06/22  2:05 AM   Result Value Ref Range    Number Of Rh Doses Indicated ZERO    Histology Request    Collection Time: 07/06/22  8:16 AM   Result Value Ref Range    Pathology Request Sent to Histo        Assessment:  Continue Routine postpartum care      Plan:  Advance Diet, Encourange Ambulation, D/C elena and Consider Discharge 24h-48 hours    Araceli Moeller M.D.

## 2022-07-06 NOTE — PROGRESS NOTES
Received patient from labor and delivery via [facility] with Amy BURT.  Assessment done. Fundus firm. Lochia scant to light. Barnhart catheter is in place, SCD's are on, Pulse oximeter in place; second bag of pitocin is infusing. Instructed patient to increase fluid intake and to watch for increased bleeding. Patient denies pain at this time. Call light within reach.

## 2022-07-06 NOTE — CARE PLAN
The patient is Stable - Low risk of patient condition declining or worsening    Shift Goals  Clinical Goals: maintain tolerable pain level    Progress made toward(s) clinical / shift goals:  pt pain is tolerable at this time. Pt aware of scheduled pain medication given to her.     Patient is not progressing towards the following goals:

## 2022-07-06 NOTE — ANESTHESIA TIME REPORT
Anesthesia Start and Stop Event Times     Date Time Event    7/5/2022 2132 Ready for Procedure     2220 Anesthesia Start     2344 Anesthesia Stop        Responsible Staff  07/05/22    Name Role Begin End    Kenan Zuniga M.D. Anesth 2220 2344        Overtime Reason:  no overtime (within assigned shift)    Comments:

## 2022-07-07 ENCOUNTER — TELEPHONE (OUTPATIENT)
Dept: OBGYN | Facility: CLINIC | Age: 37
End: 2022-07-07
Payer: COMMERCIAL

## 2022-07-07 LAB
ERYTHROCYTE [DISTWIDTH] IN BLOOD BY AUTOMATED COUNT: 51.9 FL (ref 35.9–50)
HCT VFR BLD AUTO: 22.4 % (ref 37–47)
HGB BLD-MCNC: 6.5 G/DL (ref 12–16)
MCH RBC QN AUTO: 21.3 PG (ref 27–33)
MCHC RBC AUTO-ENTMCNC: 29 G/DL (ref 33.6–35)
MCV RBC AUTO: 73.4 FL (ref 81.4–97.8)
PLATELET # BLD AUTO: 249 K/UL (ref 164–446)
PMV BLD AUTO: 11.3 FL (ref 9–12.9)
RBC # BLD AUTO: 3.05 M/UL (ref 4.2–5.4)
WBC # BLD AUTO: 12.6 K/UL (ref 4.8–10.8)

## 2022-07-07 PROCEDURE — 700111 HCHG RX REV CODE 636 W/ 250 OVERRIDE (IP): Performed by: OBSTETRICS & GYNECOLOGY

## 2022-07-07 PROCEDURE — 770002 HCHG ROOM/CARE - OB PRIVATE (112)

## 2022-07-07 PROCEDURE — 85027 COMPLETE CBC AUTOMATED: CPT

## 2022-07-07 PROCEDURE — A9270 NON-COVERED ITEM OR SERVICE: HCPCS | Performed by: OBSTETRICS & GYNECOLOGY

## 2022-07-07 PROCEDURE — 700102 HCHG RX REV CODE 250 W/ 637 OVERRIDE(OP): Performed by: OBSTETRICS & GYNECOLOGY

## 2022-07-07 PROCEDURE — 36415 COLL VENOUS BLD VENIPUNCTURE: CPT

## 2022-07-07 RX ORDER — FERROUS SULFATE 325(65) MG
325 TABLET ORAL 2 TIMES DAILY WITH MEALS
Status: DISCONTINUED | OUTPATIENT
Start: 2022-07-07 | End: 2022-07-09 | Stop reason: HOSPADM

## 2022-07-07 RX ADMIN — IBUPROFEN 800 MG: 800 TABLET, FILM COATED ORAL at 23:27

## 2022-07-07 RX ADMIN — ACETAMINOPHEN 1000 MG: 500 TABLET ORAL at 03:10

## 2022-07-07 RX ADMIN — ACETAMINOPHEN 1000 MG: 500 TABLET ORAL at 14:46

## 2022-07-07 RX ADMIN — FERROUS SULFATE TAB 325 MG (65 MG ELEMENTAL FE) 325 MG: 325 (65 FE) TAB at 09:32

## 2022-07-07 RX ADMIN — ACETAMINOPHEN 1000 MG: 500 TABLET ORAL at 09:33

## 2022-07-07 RX ADMIN — KETOROLAC TROMETHAMINE 30 MG: 30 INJECTION, SOLUTION INTRAMUSCULAR; INTRAVENOUS at 00:03

## 2022-07-07 RX ADMIN — IBUPROFEN 800 MG: 800 TABLET, FILM COATED ORAL at 06:03

## 2022-07-07 RX ADMIN — IBUPROFEN 800 MG: 800 TABLET, FILM COATED ORAL at 14:46

## 2022-07-07 RX ADMIN — FERROUS SULFATE TAB 325 MG (65 MG ELEMENTAL FE) 325 MG: 325 (65 FE) TAB at 18:38

## 2022-07-07 RX ADMIN — ACETAMINOPHEN 1000 MG: 500 TABLET ORAL at 23:26

## 2022-07-07 ASSESSMENT — PAIN DESCRIPTION - PAIN TYPE
TYPE: SURGICAL PAIN
TYPE: SURGICAL PAIN

## 2022-07-07 NOTE — CARE PLAN
The patient is Stable - Low risk of patient condition declining or worsening    Shift Goals  Clinical Goals: maintain tolerable pain level    Progress made toward(s) clinical / shift goals:  pt pain has been managed with scheduled Tylenol and Motrin.     Patient is not progressing towards the following goals:

## 2022-07-07 NOTE — PROGRESS NOTES
POD#2 s/p primary ltcs for twin gestation    S/ pt not in room, she is visiting with babies    O/  Vitals:    07/06/22 1430 07/06/22 1530 07/06/22 1820 07/07/22 0544   BP: 113/65  126/77 126/72   Pulse: 89 86 81 88   Resp: 16 18 16 17   Temp: 37.2 °C (99 °F)  37.2 °C (99 °F) 37.2 °C (99 °F)   TempSrc: Temporal  Temporal Temporal   SpO2: 95% 96% 97% 98%   Weight:       Height:         Recent Labs     07/05/22  1754 07/06/22  1044 07/07/22  0000   WBC 10.6 13.8* 12.6*   RBC 4.03* 3.02* 3.05*   HEMOGLOBIN 8.5* 6.3* 6.5*   HEMATOCRIT 29.1* 22.0* 22.4*   MCV 72.2* 72.8* 73.4*   MCH 21.1* 20.9* 21.3*   RDW 50.9* 51.8* 51.9*   PLATELETCT 280 232 249   MPV 10.9 10.6 11.3   NEUTSPOLYS 70.40  --   --    LYMPHOCYTES 19.10*  --   --    MONOCYTES 6.10  --   --    EOSINOPHILS 1.70  --   --    BASOPHILS 0.90  --   --    RBCMORPHOLO Present  --   --      A&P/POD#2 s/p primary ltcs for nrfht twin B at 35w5d  Anemia-acute on chronic-has been asymptomatic, will need to start some iron  Routine postop care

## 2022-07-07 NOTE — PROGRESS NOTES
Pt assisted up to bathroom. Pt ambulated well, without dizziness. Dayami care done, pt back to bed. Pt states that pain is well controlled with current pain medications.

## 2022-07-07 NOTE — PROGRESS NOTES
Report received from Carol BURT. Per report, elena catheter was removed at 1800 and pt has ambulated twice. pt aware that she has until 0000 to void. Pt is drinking fluids and tolerating them well.  Pt assessment complete. Pt is using the breast pump. Reviewed POC for this evening. Informed her of Neonatologist coming up to assess her  twins.     2141: updated pt on care for newborns. Babies will stay in NBN to receive Level II care and have NG tubes placed to help with feedings.     2330: updated pt on babies in NBN. Next feeding times are 0100 and 0130. Pt aware she may come to the nursery to visit babies at any time and assist with feedings.   Pt did void, 600 mL.    0310: medicated pt with Scheduled pain medicine. Gave pt update on babies blood sugars being WDL.

## 2022-07-07 NOTE — LACTATION NOTE
This note was copied from a baby's chart.  Lactation follow-up visit:    Twins 35.5 weeks now in NICU, MOB has been working 3 step plan. Suggested pump schedule given, mother understands she will continue to pump every 3 hours, 8-10 pump sessions in 24 hours. Suction 30%, flange 25 mm, mother reports she is comfortable with pump settings. Encouraged mother to call for any lactation needs.

## 2022-07-08 LAB
ALBUMIN SERPL BCP-MCNC: 3 G/DL (ref 3.2–4.9)
ALBUMIN/GLOB SERPL: 1.1 G/DL
ALP SERPL-CCNC: 144 U/L (ref 30–99)
ALT SERPL-CCNC: 11 U/L (ref 2–50)
ANION GAP SERPL CALC-SCNC: 10 MMOL/L (ref 7–16)
AST SERPL-CCNC: 19 U/L (ref 12–45)
BILIRUB SERPL-MCNC: 0.3 MG/DL (ref 0.1–1.5)
BUN SERPL-MCNC: 10 MG/DL (ref 8–22)
CALCIUM SERPL-MCNC: 8.2 MG/DL (ref 8.5–10.5)
CHLORIDE SERPL-SCNC: 112 MMOL/L (ref 96–112)
CO2 SERPL-SCNC: 20 MMOL/L (ref 20–33)
CREAT SERPL-MCNC: 0.61 MG/DL (ref 0.5–1.4)
ERYTHROCYTE [DISTWIDTH] IN BLOOD BY AUTOMATED COUNT: 53.6 FL (ref 35.9–50)
GFR SERPLBLD CREATININE-BSD FMLA CKD-EPI: 119 ML/MIN/1.73 M 2
GLOBULIN SER CALC-MCNC: 2.7 G/DL (ref 1.9–3.5)
GLUCOSE SERPL-MCNC: 112 MG/DL (ref 65–99)
HCT VFR BLD AUTO: 22.2 % (ref 37–47)
HGB BLD-MCNC: 6.3 G/DL (ref 12–16)
MCH RBC QN AUTO: 21.2 PG (ref 27–33)
MCHC RBC AUTO-ENTMCNC: 28.4 G/DL (ref 33.6–35)
MCV RBC AUTO: 74.7 FL (ref 81.4–97.8)
PLATELET # BLD AUTO: 335 K/UL (ref 164–446)
PMV BLD AUTO: 9.9 FL (ref 9–12.9)
POTASSIUM SERPL-SCNC: 4.3 MMOL/L (ref 3.6–5.5)
PROT SERPL-MCNC: 5.7 G/DL (ref 6–8.2)
RBC # BLD AUTO: 2.97 M/UL (ref 4.2–5.4)
SODIUM SERPL-SCNC: 142 MMOL/L (ref 135–145)
WBC # BLD AUTO: 12.9 K/UL (ref 4.8–10.8)

## 2022-07-08 PROCEDURE — 83540 ASSAY OF IRON: CPT

## 2022-07-08 PROCEDURE — A9270 NON-COVERED ITEM OR SERVICE: HCPCS | Performed by: OBSTETRICS & GYNECOLOGY

## 2022-07-08 PROCEDURE — 700102 HCHG RX REV CODE 250 W/ 637 OVERRIDE(OP): Performed by: OBSTETRICS & GYNECOLOGY

## 2022-07-08 PROCEDURE — 770002 HCHG ROOM/CARE - OB PRIVATE (112)

## 2022-07-08 PROCEDURE — 85027 COMPLETE CBC AUTOMATED: CPT

## 2022-07-08 PROCEDURE — 80053 COMPREHEN METABOLIC PANEL: CPT

## 2022-07-08 PROCEDURE — 36415 COLL VENOUS BLD VENIPUNCTURE: CPT

## 2022-07-08 PROCEDURE — 85046 RETICYTE/HGB CONCENTRATE: CPT

## 2022-07-08 PROCEDURE — 83550 IRON BINDING TEST: CPT

## 2022-07-08 RX ADMIN — ACETAMINOPHEN 1000 MG: 500 TABLET ORAL at 13:23

## 2022-07-08 RX ADMIN — FERROUS SULFATE TAB 325 MG (65 MG ELEMENTAL FE) 325 MG: 325 (65 FE) TAB at 18:28

## 2022-07-08 RX ADMIN — IBUPROFEN 800 MG: 800 TABLET, FILM COATED ORAL at 09:33

## 2022-07-08 RX ADMIN — ACETAMINOPHEN 1000 MG: 500 TABLET ORAL at 06:01

## 2022-07-08 RX ADMIN — IBUPROFEN 800 MG: 800 TABLET, FILM COATED ORAL at 17:28

## 2022-07-08 RX ADMIN — FERROUS SULFATE TAB 325 MG (65 MG ELEMENTAL FE) 325 MG: 325 (65 FE) TAB at 09:33

## 2022-07-08 RX ADMIN — ACETAMINOPHEN 1000 MG: 500 TABLET ORAL at 21:26

## 2022-07-08 ASSESSMENT — PAIN DESCRIPTION - PAIN TYPE
TYPE: SURGICAL PAIN
TYPE: SURGICAL PAIN;ACUTE PAIN
TYPE: SURGICAL PAIN
TYPE: ACUTE PAIN;SURGICAL PAIN
TYPE: ACUTE PAIN;SURGICAL PAIN
TYPE: SURGICAL PAIN
TYPE: SURGICAL PAIN

## 2022-07-08 NOTE — PROGRESS NOTES
Report received from Sadie MOTA RN. Patient care assumed. Chart, prenatal labs, and orders reviewed  Patient assessment complete and WDL. Fundus firm and lochia light. Patient ambulating to bathroom and voiding without difficulty. Patient denies any dizziness/lightheadedness or calf pain/tenderness. Patient also denies any chest pain, difficulty breathing or SOB, respiratory symptoms, or any recent ill contacts. Plan of care discussed with patient for the day including pumping every 3 hours, pain management, and ambulation in halls. Pain medication plan discussed with patient; reviewed scheduled pain medications with patient, and patient states she will call if PRN pain medication is wanted. All questions/concerns addressed at this time. Call light within reach, encouraged to call with needs. Will continue with routine postpartum cares.

## 2022-07-08 NOTE — DISCHARGE PLANNING
Discharge Planning Assessment Post Partum    Reason for Referral: NICU  Address: [facility] [place] [address]  Phone: [phone]  Type of Living Situation: living with FOB and children  Mom Diagnosis: Pregnancy,   Baby Diagnosis: Twins in the NICU-35.5 weeks  Primary Language: English    Name of Baby: Janiya Millard (: 22)  Father of the Baby: Michael Millard   Involved in baby’s care? Yes  Contact Information: [phone]    Prenatal Care: Yes  Mom's PCP: No PCP  PCP for new baby: Dr. Verma    Support System: FOB  Coping/Bonding between mother & baby: Yes  Source of Feeding: MOB is pumping  Supplies for Infant: prepared for the babies.  Denies any needs    Mom's Insurance: [place] and [employer]   Baby Covered on Insurance: Yes  Mother Employed/School: [employer]  Other children in the home/names & ages: parents have two other children    Financial Hardship/Income: No  Mom's Mental status: alert and oriented  Services used prior to admit: None    CPS History: No  Psychiatric History: history of anxiety  Domestic Violence History: No  Drug/ETOH History: No    Resources Provided: Offered resources and parents declined needing anything at the moment and stated they are prepared for the babies and will let us know if anything comes up  Referrals Made: None     Clearance for Discharge: Infant is cleared to discharge home with parents once medically cleared     Ongoing Plan:  Continue to follow and assist as needed

## 2022-07-08 NOTE — LACTATION NOTE
This note was copied from a baby's chart.  Twins - NICU    Mom has been using breast pump every 3 hours and denies any questions or needs.  Pump settings reviewed.  Milk storage guidelines reviewed.  Mom confirms that she and her  are taking all expressed breast milk down to NICU with date and time on label within a couple of hours of expression.    Both parents are aware that HG pump is available for rent, if needed.  Mom wants to try her Medela pump at home before deciding if she wants to rent a HG pump.  Recommended bringing all pump tubing and parts down to NICU, upon discharge home, to keep with babies so can use NICU breast pump when visiting babies.    Instructed to call for lactation support, if desired or to ask any questions or if any needs.

## 2022-07-08 NOTE — PROGRESS NOTES
Assessment completed WDL. Pt states pain is controlled with current pain medications. Plan of care discussed. Pt encouraged to call with needs.

## 2022-07-08 NOTE — PROGRESS NOTES
Assessment completed. WDL. Patient progressing according to plan of care. Patient up and ambulating. Pt states she is voiding without difficulty. Patient claims to have good pain relief with scheduled pain medications. Pt states she will call when she needs prn pain medication. Pt denies any other issues at this time. Educated pt about pumping every 3 hours. Pt encouraged to call for any needs.

## 2022-07-09 VITALS
HEIGHT: 66 IN | OXYGEN SATURATION: 98 % | BODY MASS INDEX: 27 KG/M2 | RESPIRATION RATE: 20 BRPM | SYSTOLIC BLOOD PRESSURE: 142 MMHG | TEMPERATURE: 98.3 F | DIASTOLIC BLOOD PRESSURE: 100 MMHG | WEIGHT: 168 LBS | HEART RATE: 78 BPM

## 2022-07-09 LAB
ERYTHROCYTE [DISTWIDTH] IN BLOOD BY AUTOMATED COUNT: 54.4 FL (ref 35.9–50)
FERRITIN SERPL-MCNC: 11.5 NG/ML (ref 10–291)
HCT VFR BLD AUTO: 23 % (ref 37–47)
HGB BLD-MCNC: 6.5 G/DL (ref 12–16)
HGB RETIC QN AUTO: 20.2 PG/CELL (ref 29–35)
IMM RETICS NFR: 47.1 % (ref 9.3–17.4)
IRON SATN MFR SERPL: 29 % (ref 15–55)
IRON SERPL-MCNC: 133 UG/DL (ref 40–170)
MCH RBC QN AUTO: 21.2 PG (ref 27–33)
MCHC RBC AUTO-ENTMCNC: 28.3 G/DL (ref 33.6–35)
MCV RBC AUTO: 75.2 FL (ref 81.4–97.8)
PLATELET # BLD AUTO: 388 K/UL (ref 164–446)
PMV BLD AUTO: 10.1 FL (ref 9–12.9)
RBC # BLD AUTO: 3.06 M/UL (ref 4.2–5.4)
RETICS # AUTO: 0.11 M/UL (ref 0.04–0.06)
RETICS/RBC NFR: 3.9 % (ref 0.8–2.1)
TIBC SERPL-MCNC: 460 UG/DL (ref 250–450)
UIBC SERPL-MCNC: 327 UG/DL (ref 110–370)
WBC # BLD AUTO: 12 K/UL (ref 4.8–10.8)

## 2022-07-09 PROCEDURE — 36415 COLL VENOUS BLD VENIPUNCTURE: CPT

## 2022-07-09 PROCEDURE — 82728 ASSAY OF FERRITIN: CPT

## 2022-07-09 PROCEDURE — 700102 HCHG RX REV CODE 250 W/ 637 OVERRIDE(OP): Performed by: OBSTETRICS & GYNECOLOGY

## 2022-07-09 PROCEDURE — A9270 NON-COVERED ITEM OR SERVICE: HCPCS | Performed by: OBSTETRICS & GYNECOLOGY

## 2022-07-09 PROCEDURE — 85027 COMPLETE CBC AUTOMATED: CPT

## 2022-07-09 RX ORDER — OXYCODONE HYDROCHLORIDE AND ACETAMINOPHEN 5; 325 MG/1; MG/1
1 TABLET ORAL EVERY 4 HOURS PRN
Qty: 20 TABLET | Refills: 0 | Status: SHIPPED | OUTPATIENT
Start: 2022-07-09 | End: 2022-07-14

## 2022-07-09 RX ORDER — FERROUS SULFATE 325(65) MG
325 TABLET ORAL 2 TIMES DAILY WITH MEALS
Qty: 60 TABLET | Refills: 0 | Status: SHIPPED | OUTPATIENT
Start: 2022-07-09 | End: 2022-08-08

## 2022-07-09 RX ORDER — PSEUDOEPHEDRINE HCL 30 MG
100 TABLET ORAL 2 TIMES DAILY PRN
Qty: 40 CAPSULE | Refills: 1 | Status: SHIPPED | OUTPATIENT
Start: 2022-07-09

## 2022-07-09 RX ORDER — IBUPROFEN 800 MG/1
800 TABLET ORAL EVERY 8 HOURS PRN
Qty: 30 TABLET | Refills: 1 | Status: SHIPPED | OUTPATIENT
Start: 2022-07-10

## 2022-07-09 RX ADMIN — ACETAMINOPHEN 1000 MG: 500 TABLET ORAL at 10:16

## 2022-07-09 RX ADMIN — FERROUS SULFATE TAB 325 MG (65 MG ELEMENTAL FE) 325 MG: 325 (65 FE) TAB at 09:19

## 2022-07-09 RX ADMIN — IBUPROFEN 800 MG: 800 TABLET, FILM COATED ORAL at 10:19

## 2022-07-09 RX ADMIN — ACETAMINOPHEN 1000 MG: 500 TABLET ORAL at 03:56

## 2022-07-09 RX ADMIN — IBUPROFEN 800 MG: 800 TABLET, FILM COATED ORAL at 02:16

## 2022-07-09 RX ADMIN — ACETAMINOPHEN 1000 MG: 500 TABLET ORAL at 16:30

## 2022-07-09 RX ADMIN — FERROUS SULFATE TAB 325 MG (65 MG ELEMENTAL FE) 325 MG: 325 (65 FE) TAB at 18:44

## 2022-07-09 RX ADMIN — IBUPROFEN 800 MG: 800 TABLET, FILM COATED ORAL at 18:44

## 2022-07-09 ASSESSMENT — PAIN DESCRIPTION - PAIN TYPE
TYPE: ACUTE PAIN
TYPE: ACUTE PAIN
TYPE: SURGICAL PAIN

## 2022-07-09 NOTE — PROGRESS NOTES
0700 - Updates received from Adam ZAMORA RN. Patient care assumed.  0920 - Patient assessment complete and WDL. Fundus firm and lochia scant. Patient ambulating to bathroom and voiding without difficulty. Patient denies any dizziness/lightheadedness or calf pain/tenderness. Patient c/o headache, denies any blurry vision, double vision or RUQ pain.   1016 - Patient medicated for pain per MAR order. POC discussed with patient at bedside and pain medication plan discussed as well. Patient pumping every 3 hours. Patient has no questions at this time. Will continue with routine postpartum cares.

## 2022-07-09 NOTE — CARE PLAN
The patient is Stable - Low risk of patient condition declining or worsening    Shift Goals  Clinical Goals: pain control, VSS, visit babies in NICU    Progress made toward(s) clinical / shift goals:  Pain controlled with scheduled pain medications, VS have remained stable and patient has frequently visited infants in NICU    Patient is not progressing towards the following goals: N/A

## 2022-07-09 NOTE — PROGRESS NOTES
Progress Note    Elidia Millard   Post-op day 3  Chief Admitting Dx: Indication for care in labor or delivery [O75.9]  Delivery Type:  for twins 35.5 weeks      Subjective:  The last 24 hrs bp has been elevated every time. Denies ha/blurry vision or ruq pain.  Pulse is elevated tonight too. She denies pain: taking only tylenol and motrin. Iron has been really hard on her stomach but she is trying. Denies dizziness. Pumping q 2 hrs.   Babies are stable in NICU  Vitals:    22 1943 22 0600 22 1800 22   BP: (!) 141/80 (!) 143/78 (!) 147/95 (!) 151/96   Pulse: 91 86 92 (!) 115   Resp: 18 18 18 16   Temp: 36.8 °C (98.3 °F) 36.8 °C (98.3 °F) 37.2 °C (98.9 °F) 37.7 °C (99.9 °F)   TempSrc: Temporal Temporal Temporal Temporal   SpO2: 99% 100% 98% 99%   Weight:       Height:           Exam:  Gen: in no acute distress/pale  Abdomen:soft nt/nd firm fundus  Incision c/d/i with mepelex dressing  Ext: no calf pain aggie LE/no edema    Labs:   No results found for this or any previous visit (from the past 24 hour(s)).    Assessment:POD 3  PP htn  Anemia - asymptomatic but having a hard time w. Oral iron    Plan:  [facility] labs ordered, will reassess her bp after labs are back and another bp rn to call me with report.  Will consider iron infusion assuming she does not need to go on magnesium sulfate at this time.       Ting Luis M.D.

## 2022-07-09 NOTE — PROGRESS NOTES
1900: Received bedside report from CARMEL Thorpe. Patient states minimal pain and states tylenol and motrin are working. Whiteboards updated, POC discussed. Patient pumping q 3 hrs, settings reviewed. Call light within reach. Patient encouraged to call with any needs and or concerns.     2216: Reported BP to Dr. Luis.     0129: Labs for dosing of iron transfusion still not back. Lab contacted, apparently having issues. Will update this RN with estimated time.     0155: Lab stated labs should be running soon.       0338: Pharmacy called to get estimated time for dosing. Per Pharmacist, the pharmacist in am will review it around 0700.

## 2022-07-10 NOTE — CARE PLAN
The patient is Stable - Low risk of patient condition declining or worsening    Shift Goals  Clinical Goals: pain control, BS stable WDL, pump every 3 hours    Progress made toward(s) clinical / shift goals:  pain controlled with scheduled pain medications, BPs consistently 140s/90s, patient pumping every 3 hours    Patient is not progressing towards the following goals: N/A

## 2022-07-10 NOTE — DISCHARGE INSTRUCTIONS

## 2022-07-10 NOTE — DISCHARGE SUMMARY
Discharge Summary:      Elidia Wade Millard      Admit Date:   2022  Discharge Date:  2022     Admitting diagnosis:  Indication for care in labor or delivery [O75.9]  Dichorionic-diamniotic Twin IUP at 35w5d  IUGR Twin A  Twin A fetal VSD  NR-FHT Twin B (spontaneous decelerations)  AMA  Anemia  Desires sterilization  VTX/Breech presentation  Discharge Diagnosis: Status post  for twin pregnancy with malpresentation of Twin B and spontaneous FHT decelerations of Twin B.  Pregnancy Complications: above  Tubal Ligation:  yes        History:  Past Medical History:   Diagnosis Date   • Anxiety disorder      OB History    Para Term  AB Living   3 3 2 1   4   SAB IAB Ectopic Molar Multiple Live Births           1 4      # Outcome Date GA Lbr Iggy/2nd Weight Sex Delivery Anes PTL Lv   3A  22 35w5d  2.03 kg (4 lb 7.6 oz) F CS-LTranv Spinal N MARY   3B  22 35w5d  2.04 kg (4 lb 8 oz) M CS-LTranv Spinal N MARY   2 Term 08 40w0d  3.232 kg (7 lb 2 oz) F Vag-Spont   MARY      Birth Comments: No comlications   1 Term                 Amoxicillin  Patient Active Problem List    Diagnosis Date Noted   • Indication for care in labor or delivery 2022        Hospital Course:   37 y.o. , now para , was admitted with the above mentioned diagnosis, underwent abnormal FHT in the office (Twin B), in setting of Di-Di Twin IUP at 35w5d and FGR of Twin A with malpresentation of Twin B,  And underwent  section for fetal  Malpresentation of Twin B.   Patient postpartum course was unremarkable, with progressive advancement in diet , ambulation and toleration of oral analgesia. Patient without complaints today and desires discharge.      Vitals:    22 1000 22 1220 22 1351 22 1736   BP: (!) 149/97 (!) 147/92 (!) 145/99 (!) 142/100   Pulse: (!) 109 92 (!) 105 78   Resp: 17 16 18 20   Temp: 37.4 °C (99.4 °F) 36.8 °C (98.3 °F) 36.8 °C (98.2 °F)  36.8 °C (98.3 °F)   TempSrc: Temporal Temporal Temporal Temporal   SpO2: 98% 98% 96% 98%   Weight:       Height:           Current Facility-Administered Medications   Medication Dose   • ferrous sulfate tablet 325 mg  325 mg   • lactated ringers infusion     • ibuprofen (MOTRIN) tablet 800 mg  800 mg    Followed by   • [START ON 7/10/2022] ibuprofen (MOTRIN) tablet 800 mg  800 mg   • acetaminophen (TYLENOL) tablet 1,000 mg  1,000 mg    Followed by   • [START ON 7/10/2022] acetaminophen (TYLENOL) tablet 1,000 mg  1,000 mg   • oxyCODONE immediate-release (ROXICODONE) tablet 5 mg  5 mg   • oxyCODONE immediate release (ROXICODONE) tablet 10 mg  10 mg   • ondansetron (ZOFRAN) syringe/vial injection 4 mg  4 mg    Or   • ondansetron (ZOFRAN ODT) dispertab 4 mg  4 mg   • diphenhydrAMINE (BENADRYL) tablet/capsule 25 mg  25 mg    Or   • diphenhydrAMINE (BENADRYL) injection 25 mg  25 mg   • docusate sodium (COLACE) capsule 100 mg  100 mg   • tetanus-dipth-acell pertussis (Tdap) inj 0.5 mL  0.5 mL   • measles, mumps and rubella vaccine (MMR) injection 0.5 mL  0.5 mL   • prenatal plus vitamin (STUARTNATAL 1+1) 27-1 MG tablet 1 Tablet  1 Tablet   • lactated ringers (LR) infusion         Exam:  Gen: NAD, AAO  Abdomen: Abdomen soft, non-tender. No masses,  No organomegally, FF @ U. No rebound/guarding.  Fundus Tender: No  Incision: C/D/I  Extremity: tr edema, no redness or tenderness in the calves or thighs, 2+DPP, Homans sign is negative, no sign of DVT      Labs:  Recent Labs     07/07/22  0000 07/08/22 2001 07/09/22  1035   WBC 12.6* 12.9* 12.0*   RBC 3.05* 2.97* 3.06*   HEMOGLOBIN 6.5* 6.3* 6.5*   HEMATOCRIT 22.4* 22.2* 23.0*   MCV 73.4* 74.7* 75.2*   MCH 21.3* 21.2* 21.2*   MCHC 29.0* 28.4* 28.3*   RDW 51.9* 53.6* 54.4*   PLATELETCT 249 335 388   MPV 11.3 9.9 10.1        Activity:   Discharge to home  Pelvic Rest x 6 weeks    Assessment:  normal postpartum course  Discharge Assessment: No heavy bleeding or foul vaginal  discharge      Follow up: 2wk for incision check with Araceli Moeller MD     Discharge Meds:   Current Outpatient Medications   Medication Sig Dispense Refill   • docusate sodium 100 MG Cap Take 100 mg by mouth 2 times a day as needed for Constipation. 40 Capsule 1   • ferrous sulfate 325 (65 Fe) MG tablet Take 1 Tablet by mouth 2 times a day with meals for 30 days. 60 Tablet 0   • [START ON 7/10/2022] ibuprofen (MOTRIN) 800 MG Tab Take 1 Tablet by mouth every 8 hours as needed for Mild Pain or Moderate Pain. 30 Tablet 1   • oxyCODONE-acetaminophen (PERCOCET) 5-325 MG Tab Take 1 Tablet by mouth every four hours as needed for Moderate Pain or Severe Pain for up to 5 days. 20 Tablet 0       Leo Salazar M.D.

## 2022-07-13 ENCOUNTER — APPOINTMENT (OUTPATIENT)
Dept: ADMISSIONS | Facility: MEDICAL CENTER | Age: 37
End: 2022-07-13
Attending: OBSTETRICS & GYNECOLOGY
Payer: COMMERCIAL

## 2022-07-13 NOTE — LACTATION NOTE
Lactation NICU appointment:    Baby A twin latch assist consult. Baby only few sucks- no latch & non-nutritive suckling, see latch assessment score. MOB reports she has been pumping regularly every 3 hours & collecting 40-60 ml's with each pump session.

## 2024-03-12 ENCOUNTER — OFFICE VISIT (OUTPATIENT)
Dept: URGENT CARE | Facility: CLINIC | Age: 39
End: 2024-03-12
Payer: COMMERCIAL

## 2024-03-12 VITALS
WEIGHT: 142 LBS | SYSTOLIC BLOOD PRESSURE: 110 MMHG | HEART RATE: 70 BPM | TEMPERATURE: 97.8 F | DIASTOLIC BLOOD PRESSURE: 76 MMHG | OXYGEN SATURATION: 98 % | HEIGHT: 66 IN | BODY MASS INDEX: 22.82 KG/M2 | RESPIRATION RATE: 14 BRPM

## 2024-03-12 DIAGNOSIS — S29.011A MUSCLE STRAIN OF CHEST WALL, INITIAL ENCOUNTER: ICD-10-CM

## 2024-03-12 PROCEDURE — 3074F SYST BP LT 130 MM HG: CPT

## 2024-03-12 PROCEDURE — 99203 OFFICE O/P NEW LOW 30 MIN: CPT

## 2024-03-12 PROCEDURE — 3078F DIAST BP <80 MM HG: CPT

## 2024-03-12 RX ORDER — CYCLOBENZAPRINE HCL 10 MG
10 TABLET ORAL
Qty: 14 TABLET | Refills: 0 | Status: SHIPPED | OUTPATIENT
Start: 2024-03-12

## 2024-03-12 ASSESSMENT — ENCOUNTER SYMPTOMS
SHORTNESS OF BREATH: 0
FEVER: 0
FALLS: 0
COUGH: 1

## 2024-03-12 ASSESSMENT — FIBROSIS 4 INDEX: FIB4 SCORE: 0.72

## 2024-03-12 NOTE — PROGRESS NOTES
Subjective:     CHIEF COMPLAINT  Chief Complaint   Patient presents with    Cough     Cough for a long time and now having rib pain on left side x yesterday        HPI  Elidia Millard is a very pleasant 38 y.o. female who presents with right-sided rib pain that started suddenly last night when she twisted in an unusual position while doing laundry.  She feels like she may have pulled a muscle.  She reports that prior to that she has had a cough for the past 3 weeks.  She reports that the cough has been moderately bothersome, but she is otherwise feeling well.  She has not had any fevers or shortness of breath.  She has not noticed any bruising to her chest wall and is otherwise feeling well at this time.    REVIEW OF SYSTEMS  Review of Systems   Constitutional:  Negative for fever.   Respiratory:  Positive for cough. Negative for shortness of breath.    Musculoskeletal:  Negative for falls.       PAST MEDICAL HISTORY  Patient Active Problem List    Diagnosis Date Noted    Indication for care in labor or delivery 2022       SURGICAL HISTORY   has a past surgical history that includes full rout obste care, deliv (N/A, 2022).    ALLERGIES  Allergies   Allergen Reactions    Amoxicillin Rash     rash       CURRENT MEDICATIONS  Home Medications       Reviewed by Sabiha Florence P.A.-C. (Physician Assistant) on 24 at 0850  Med List Status: <None>     Medication Last Dose Status   docusate sodium 100 MG Cap  Active   ibuprofen (MOTRIN) 800 MG Tab  Active   Prenat w/o A-FeCbGl-DSS-FA-DHA (CITRANATAL ASSURE) 35-1 & 300 MG MISC 2022 Active                    SOCIAL HISTORY  Social History     Tobacco Use    Smoking status: Never    Smokeless tobacco: Never   Vaping Use    Vaping Use: Never used   Substance and Sexual Activity    Alcohol use: No    Drug use: No    Sexual activity: Yes     Partners: Male     Comment: Copper IUD       FAMILY HISTORY  Family History   Problem Relation Age of  "Onset    Cancer Maternal Grandmother         breast    Cancer Paternal Grandmother         breast          Objective:     VITAL SIGNS: /76 (BP Location: Left arm, Patient Position: Sitting, BP Cuff Size: Large adult)   Pulse 70   Temp 36.6 °C (97.8 °F) (Temporal)   Resp 14   Ht 1.676 m (5' 6\")   Wt 64.4 kg (142 lb)   SpO2 98%   BMI 22.92 kg/m²     PHYSICAL EXAM  Physical Exam  Vitals reviewed.   Constitutional:       General: She is not in acute distress.     Appearance: Normal appearance. She is not ill-appearing or toxic-appearing.   HENT:      Head: Normocephalic and atraumatic.      Mouth/Throat:      Mouth: Mucous membranes are moist.   Eyes:      Conjunctiva/sclera: Conjunctivae normal.      Pupils: Pupils are equal, round, and reactive to light.   Cardiovascular:      Rate and Rhythm: Normal rate and regular rhythm.      Heart sounds: Normal heart sounds.   Pulmonary:      Effort: Pulmonary effort is normal. No respiratory distress.      Breath sounds: Normal breath sounds. No stridor. No wheezing, rhonchi or rales.   Chest:      Chest wall: Tenderness present.          Comments: Mild tenderness to palpation present on right anterior lateral chest wall.  No bony tenderness.  No bruising, lacerations, or erythema.  No bony step-offs or crepitus.  Skin:     General: Skin is warm and dry.   Neurological:      General: No focal deficit present.      Mental Status: She is alert and oriented to person, place, and time.   Psychiatric:         Mood and Affect: Mood normal.         Assessment/Plan:     1. Muscle strain of chest wall, initial encounter  - cyclobenzaprine (FLEXERIL) 10 mg Tab; Take 1 Tablet by mouth 1 time a day as needed for Moderate Pain or Muscle Spasms.  Dispense: 14 Tablet; Refill: 0  -Warm compresses to affected area  -Tylenol/ibuprofen over-the-counter as needed for discomfort  -Return to clinic if symptoms worsen or fail to resolve    MDM/Comments:  Patient has stable vital signs " and is non-toxic appearing.  Patient appears to have strained a muscle on the chest wall.  There is no bony tenderness and lungs are clear to auscultation bilaterally.  X-ray not indicated at this time.  Patient provided Flexeril to use as needed in the evenings.  Discussed supportive care with warm/cool compresses, rest, Tylenol/Ibuprofen as needed. Patient demonstrated understanding of treatment plan at this time and will RTC if symptoms worsen or fail to resolve.     Differential diagnosis, natural history, supportive care, and indications for immediate follow-up discussed. All questions answered. Patient agrees with the plan of care.    Follow-up as needed if symptoms worsen or fail to improve to PCP, Urgent care or Emergency Room.    I have personally reviewed prior external notes and test results pertinent to today's visit.  I have independently reviewed and interpreted all diagnostics ordered during this urgent care acute visit.   Discussed management options (risks,benefits, and alternatives to treatment). Pt expresses understanding and the treatment plan was agreed upon. Questions were encouraged and answered to pt's satisfaction.    Please note that this dictation was created using voice recognition software. I have made a reasonable attempt to correct obvious errors, but I expect that there are errors of grammar and possibly content that I did not discover before finalizing the note.

## 2024-06-04 ENCOUNTER — OFFICE VISIT (OUTPATIENT)
Dept: URGENT CARE | Facility: CLINIC | Age: 39
End: 2024-06-04
Payer: COMMERCIAL

## 2024-06-04 VITALS
HEIGHT: 66 IN | SYSTOLIC BLOOD PRESSURE: 118 MMHG | BODY MASS INDEX: 22.66 KG/M2 | OXYGEN SATURATION: 99 % | WEIGHT: 141 LBS | HEART RATE: 105 BPM | TEMPERATURE: 99.2 F | DIASTOLIC BLOOD PRESSURE: 86 MMHG | RESPIRATION RATE: 16 BRPM

## 2024-06-04 DIAGNOSIS — H69.92 EUSTACHIAN TUBE DYSFUNCTION, LEFT: ICD-10-CM

## 2024-06-04 PROCEDURE — 3079F DIAST BP 80-89 MM HG: CPT | Performed by: PHYSICIAN ASSISTANT

## 2024-06-04 PROCEDURE — 3074F SYST BP LT 130 MM HG: CPT | Performed by: PHYSICIAN ASSISTANT

## 2024-06-04 PROCEDURE — 99213 OFFICE O/P EST LOW 20 MIN: CPT | Performed by: PHYSICIAN ASSISTANT

## 2024-06-04 ASSESSMENT — FIBROSIS 4 INDEX: FIB4 SCORE: 0.74

## 2024-06-04 NOTE — PROGRESS NOTES
"Subjective:   Elidia Millard is a 39 y.o. female who presents for Earache (Left ear feels like there is a crinkly noise inside x 3 days)  Is a pleasant 39-year-old female who presents with odd pressure and crackling sensation from the left ear with movement.  She denies pain itching or drainage.  No recent swimming.  No foreign body concerns.  She does have a history of seasonal allergies only partially treated.  She occasionally takes Allegra but has not taken any in the last few days.  Denies sinus pressure nasal congestion postnasal drainage or cough      Medications:  CitraNatal Assure Misc  cyclobenzaprine Tabs  docusate sodium Caps  ibuprofen Tabs    Allergies:             Amoxicillin    Surgical History:         Past Surgical History:   Procedure Laterality Date    NV FULL ROUT OBSTE CARE, DELIV N/A 2022    Procedure:  SECTION, PRIMARY TWINS;  Surgeon: Daisy Ramires M.D.;  Location: [facility];  Service: Labor and Delivery       Past Social Hx:  Elidia Millard  reports that she has never smoked. She has never used smokeless tobacco. She reports that she does not drink alcohol and does not use drugs.     Past Family Hx:   Elidia Millard family history includes Cancer in her maternal grandmother and paternal grandmother.       Problem list, medications, and allergies reviewed by myself today in Epic.     Objective:     /86 (BP Location: Left arm, Patient Position: Sitting, BP Cuff Size: Adult)   Pulse (!) 105   Temp 37.3 °C (99.2 °F) (Temporal)   Resp 16   Ht 1.676 m (5' 6\")   Wt 64 kg (141 lb)   SpO2 99%   BMI 22.76 kg/m²     Physical Exam  Vitals and nursing note reviewed.   Constitutional:       General: She is not in acute distress.     Appearance: Normal appearance. She is not ill-appearing or toxic-appearing.   HENT:      Head: Normocephalic.      Right Ear: Hearing and external ear normal. No decreased hearing noted. No drainage, " swelling or tenderness. No foreign body. Tympanic membrane is not injected, erythematous or bulging.      Left Ear: Hearing and external ear normal. No decreased hearing noted. No drainage, swelling or tenderness.  No middle ear effusion. No foreign body. Tympanic membrane is retracted. Tympanic membrane is not injected, erythematous or bulging.      Nose: No congestion or rhinorrhea.      Mouth/Throat:      Mouth: Mucous membranes are moist.      Pharynx: Oropharynx is clear. No oropharyngeal exudate or posterior oropharyngeal erythema.      Tonsils: No tonsillar exudate.   Eyes:      General:         Right eye: No discharge.         Left eye: No discharge.      Pupils: Pupils are equal, round, and reactive to light.   Cardiovascular:      Rate and Rhythm: Normal rate and regular rhythm.      Pulses: Normal pulses.      Heart sounds: Normal heart sounds.   Pulmonary:      Effort: Pulmonary effort is normal. No respiratory distress.      Breath sounds: Normal breath sounds. No stridor or decreased air movement. No wheezing, rhonchi or rales.   Musculoskeletal:      Cervical back: Neck supple. No rigidity.   Lymphadenopathy:      Cervical: No cervical adenopathy.   Skin:     General: Skin is warm.   Neurological:      Mental Status: She is alert.         Assessment/Plan:     Diagnosis and Associated Orders:     1. Eustachian tube dysfunction, left        Comments/MDM:  Exam and history consistent with eustachian tube dysfunction and chronic seasonal allergies.  Trial nonsedating antihistamine, Flonase, Sudafed prior to flying and Afrin short-term use only.  No evidence of otitis media or indication for antibiotics    The eustachian tubes are small passageways that connect the upper part of your throat (pharynx) to your middle ears. When you sneeze, swallow or yawn, your eustachian tubes open, allowing air to flow in and out. But sometimes a eustachian tube might get plugged. This is called eustachian tube  dysfunction. When this happens, sounds may be muffled and your ear may feel full, or you may have ear pain.    The most common cause of eustachian tube dysfunction is excessive mucus and inflammation of the tube caused by colds, the flu, sinus infections or allergies.     Children are at greater risk of eustachian tube dysfunction because their tubes are shorter and straighter than those of an adult. This makes it easier for germs to reach the middle ear and for fluid to become trapped there. Also, children’s immune systems are not fully developed, so it’s harder for them to fight off infections.     Smoking and obesity are also risk factors. Smoking damages the cilia (the tiny hairs that sweep mucus from the middle ear to the back of the nose). This can allow mucus to gather in the tubes. In people who are obese, fatty deposits around the tubes can also lead to eustachian tube dysfunction.    If you have eustachian tube dysfunction:   Your ears may feel plugged or full.   Sounds may seem muffled.   You may feel a popping or clicking sensation (children may say their “ear tickles”).   You may have pain in one or both ears.   You may hear ringing in your ears (tinnitus).   You may sometimes have trouble keeping your balance.   Your symptoms may get worse when you are flying (because of altitude changes). Riding in elevators, driving through mountains or diving may also make your symptoms worse.     How is eustachian tube dysfunction treated?  Symptoms of eustachian tube dysfunction usually go away without treatment. Eustachian tube exercises, such as swallowing, yawning or chewing gum, can help open the eustachian tubes. You can help relieve the “full ear” feeling by taking a deep breath and blowing with your mouth shut and your nostrils pinched closed.   If you think your baby may have eustachian tube dysfunction, give him or her a bottle or a pacifier to encourage the swallow reflex.    If these strategies don’t  help, your doctor may suggest other options. These can include:   Using a decongestant to reduce the swelling of the lining of the tubes.   Taking an antihistamine and/or using a steroid nasal spray such as flonase to reduce the allergic response if allergies are a factor.     I personally reviewed prior external notes and test results pertinent to today's visit. Supportive care, natural history, differential diagnoses, and indications for immediate follow-up discussed. Return to clinic or go to ED if symptoms worsen or persist.  Red flag symptoms discussed.  Patient/Parent/Guardian voices understanding. Follow-up with your primary care provider in 3-5 days.  All side effects of medication discussed including allergic response, GI upset, tendon injury, rash, sedation etc    Please note that this dictation was created using voice recognition software. I have made a reasonable attempt to correct obvious errors, but I expect that there are errors of grammar and possibly content that I did not discover before finalizing the note.    This note was electronically signed by Nany Lance PA-C

## (undated) DEVICE — BLANKET UNDERBODY FULL ACCES - (5/CA)

## (undated) DEVICE — WATER IRRIGATION STERILE 1000ML (12EA/CA)

## (undated) DEVICE — SET EXTENSION WITH 2 PORTS (48EA/CA) ***PART #2C8610 IS A SUBSTITUTE*****

## (undated) DEVICE — GLOVE BIOGEL SZ 7 SURGICAL PF LTX - (50PR/BX 4BX/CA)

## (undated) DEVICE — CHLORAPREP 26 ML APPLICATOR - ORANGE TINT(25/CA)

## (undated) DEVICE — SUTURE 4-0 VICRYL PLUSFS-1 - 27 INCH (36/BX)

## (undated) DEVICE — KIT  I.V. START (100EA/CA)

## (undated) DEVICE — CATHETER IV NON-SAFETY 18 GA X 1 1/4 (50/BX 4BX/CA)

## (undated) DEVICE — PAD GROUNDING PRE-JELLED - (50EA/PK)

## (undated) DEVICE — PACK ROOM TURNOVER L&D (12/CA)

## (undated) DEVICE — SODIUM CHL IRRIGATION 0.9% 1000ML (12EA/CA)

## (undated) DEVICE — PACK C-SECTION (2EA/CA)

## (undated) DEVICE — TUBING CLEARLINK DUO-VENT - C-FLO (48EA/CA)

## (undated) DEVICE — DRESSING POST OP BORDER 4 X 10 (5EA/BX)

## (undated) DEVICE — SLEEVE, SEQUENTIAL CALF REG

## (undated) DEVICE — CLOSURE SKIN STRIP 1/2 X 4 IN - (STERI STRIP) (50/BX 4BX/CA)

## (undated) DEVICE — GLOVE BIOGEL INDICATOR SZ 7SURGICAL PF LTX - (50/BX 4BX/CA)

## (undated) DEVICE — SUTURE 0 VICRYL PLUS CT-1 - 36 INCH (36/BX)

## (undated) DEVICE — SUTURE 3-0 VICRYL PLUS CT-1 - 36 INCH (36/BX)

## (undated) DEVICE — TAPE CLOTH MEDIPORE 6 INCH - (12RL/CA)

## (undated) DEVICE — TRAY SPINAL ANESTHESIA NON-SAFETY (10/CA)

## (undated) DEVICE — STAPLER SKIN DISP - (6/BX 10BX/CA) VISISTAT

## (undated) DEVICE — HEAD HOLDER JUNIOR/ADULT

## (undated) DEVICE — CANISTER SUCTION 3000ML MECHANICAL FILTER AUTO SHUTOFF MEDI-VAC NONSTERILE LF DISP  (40EA/CA)